# Patient Record
Sex: MALE | URBAN - METROPOLITAN AREA
[De-identification: names, ages, dates, MRNs, and addresses within clinical notes are randomized per-mention and may not be internally consistent; named-entity substitution may affect disease eponyms.]

---

## 2020-07-06 ENCOUNTER — INPATIENT (INPATIENT)
Facility: HOSPITAL | Age: 75
LOS: 3 days | Discharge: HOME | End: 2020-07-10
Attending: SURGERY | Admitting: SURGERY
Payer: MEDICARE

## 2020-07-06 VITALS
OXYGEN SATURATION: 100 % | RESPIRATION RATE: 16 BRPM | TEMPERATURE: 98 F | SYSTOLIC BLOOD PRESSURE: 117 MMHG | DIASTOLIC BLOOD PRESSURE: 56 MMHG | HEART RATE: 53 BPM

## 2020-07-06 DIAGNOSIS — Z98.890 OTHER SPECIFIED POSTPROCEDURAL STATES: Chronic | ICD-10-CM

## 2020-07-06 DIAGNOSIS — Z90.49 ACQUIRED ABSENCE OF OTHER SPECIFIED PARTS OF DIGESTIVE TRACT: Chronic | ICD-10-CM

## 2020-07-06 DIAGNOSIS — I95.9 HYPOTENSION, UNSPECIFIED: ICD-10-CM

## 2020-07-06 LAB
ALBUMIN SERPL ELPH-MCNC: 3.3 G/DL — SIGNIFICANT CHANGE UP (ref 3.3–5)
ALBUMIN SERPL ELPH-MCNC: 3.4 G/DL — SIGNIFICANT CHANGE UP (ref 3.3–5)
ALP SERPL-CCNC: 57 U/L — SIGNIFICANT CHANGE UP (ref 40–120)
ALP SERPL-CCNC: 64 U/L — SIGNIFICANT CHANGE UP (ref 40–120)
ALT FLD-CCNC: 26 U/L — SIGNIFICANT CHANGE UP (ref 4–41)
ALT FLD-CCNC: 30 U/L — SIGNIFICANT CHANGE UP (ref 4–41)
ANION GAP SERPL CALC-SCNC: 14 MMO/L — SIGNIFICANT CHANGE UP (ref 7–14)
ANION GAP SERPL CALC-SCNC: 9 MMO/L — SIGNIFICANT CHANGE UP (ref 7–14)
APTT BLD: 33.2 SEC — SIGNIFICANT CHANGE UP (ref 27–36.3)
AST SERPL-CCNC: 17 U/L — SIGNIFICANT CHANGE UP (ref 4–40)
AST SERPL-CCNC: 21 U/L — SIGNIFICANT CHANGE UP (ref 4–40)
BILIRUB SERPL-MCNC: 0.6 MG/DL — SIGNIFICANT CHANGE UP (ref 0.2–1.2)
BILIRUB SERPL-MCNC: 0.8 MG/DL — SIGNIFICANT CHANGE UP (ref 0.2–1.2)
BLD GP AB SCN SERPL QL: NEGATIVE — SIGNIFICANT CHANGE UP
BUN SERPL-MCNC: 32 MG/DL — HIGH (ref 7–23)
BUN SERPL-MCNC: 33 MG/DL — HIGH (ref 7–23)
CALCIUM SERPL-MCNC: 8.5 MG/DL — SIGNIFICANT CHANGE UP (ref 8.4–10.5)
CALCIUM SERPL-MCNC: 8.8 MG/DL — SIGNIFICANT CHANGE UP (ref 8.4–10.5)
CHLORIDE SERPL-SCNC: 101 MMOL/L — SIGNIFICANT CHANGE UP (ref 98–107)
CHLORIDE SERPL-SCNC: 106 MMOL/L — SIGNIFICANT CHANGE UP (ref 98–107)
CO2 SERPL-SCNC: 23 MMOL/L — SIGNIFICANT CHANGE UP (ref 22–31)
CO2 SERPL-SCNC: 25 MMOL/L — SIGNIFICANT CHANGE UP (ref 22–31)
CREAT SERPL-MCNC: 1.52 MG/DL — HIGH (ref 0.5–1.3)
CREAT SERPL-MCNC: 1.89 MG/DL — HIGH (ref 0.5–1.3)
GLUCOSE SERPL-MCNC: 138 MG/DL — HIGH (ref 70–99)
GLUCOSE SERPL-MCNC: 221 MG/DL — HIGH (ref 70–99)
HCT VFR BLD CALC: 24.4 % — LOW (ref 39–50)
HCT VFR BLD CALC: 25.9 % — LOW (ref 39–50)
HGB BLD-MCNC: 7.7 G/DL — LOW (ref 13–17)
HGB BLD-MCNC: 8 G/DL — LOW (ref 13–17)
INR BLD: 1.8 — HIGH (ref 0.88–1.17)
MAGNESIUM SERPL-MCNC: 2.1 MG/DL — SIGNIFICANT CHANGE UP (ref 1.6–2.6)
MCHC RBC-ENTMCNC: 25.5 PG — LOW (ref 27–34)
MCHC RBC-ENTMCNC: 26.2 PG — LOW (ref 27–34)
MCHC RBC-ENTMCNC: 30.9 % — LOW (ref 32–36)
MCHC RBC-ENTMCNC: 31.6 % — LOW (ref 32–36)
MCV RBC AUTO: 82.5 FL — SIGNIFICANT CHANGE UP (ref 80–100)
MCV RBC AUTO: 83 FL — SIGNIFICANT CHANGE UP (ref 80–100)
NRBC # FLD: 0 K/UL — SIGNIFICANT CHANGE UP (ref 0–0)
NRBC # FLD: 0 K/UL — SIGNIFICANT CHANGE UP (ref 0–0)
OB PNL STL: NEGATIVE — SIGNIFICANT CHANGE UP
PHOSPHATE SERPL-MCNC: 3.5 MG/DL — SIGNIFICANT CHANGE UP (ref 2.5–4.5)
PLATELET # BLD AUTO: 161 K/UL — SIGNIFICANT CHANGE UP (ref 150–400)
PLATELET # BLD AUTO: 174 K/UL — SIGNIFICANT CHANGE UP (ref 150–400)
PMV BLD: 9.1 FL — SIGNIFICANT CHANGE UP (ref 7–13)
PMV BLD: 9.3 FL — SIGNIFICANT CHANGE UP (ref 7–13)
POTASSIUM SERPL-MCNC: 3.8 MMOL/L — SIGNIFICANT CHANGE UP (ref 3.5–5.3)
POTASSIUM SERPL-MCNC: 4 MMOL/L — SIGNIFICANT CHANGE UP (ref 3.5–5.3)
POTASSIUM SERPL-SCNC: 3.8 MMOL/L — SIGNIFICANT CHANGE UP (ref 3.5–5.3)
POTASSIUM SERPL-SCNC: 4 MMOL/L — SIGNIFICANT CHANGE UP (ref 3.5–5.3)
PROT SERPL-MCNC: 5.7 G/DL — LOW (ref 6–8.3)
PROT SERPL-MCNC: 6.1 G/DL — SIGNIFICANT CHANGE UP (ref 6–8.3)
PROTHROM AB SERPL-ACNC: 20.9 SEC — HIGH (ref 9.8–13.1)
RBC # BLD: 2.94 M/UL — LOW (ref 4.2–5.8)
RBC # BLD: 3.14 M/UL — LOW (ref 4.2–5.8)
RBC # FLD: 15.8 % — HIGH (ref 10.3–14.5)
RBC # FLD: 15.9 % — HIGH (ref 10.3–14.5)
RH IG SCN BLD-IMP: POSITIVE — SIGNIFICANT CHANGE UP
RH IG SCN BLD-IMP: POSITIVE — SIGNIFICANT CHANGE UP
SARS-COV-2 RNA SPEC QL NAA+PROBE: SIGNIFICANT CHANGE UP
SODIUM SERPL-SCNC: 138 MMOL/L — SIGNIFICANT CHANGE UP (ref 135–145)
SODIUM SERPL-SCNC: 140 MMOL/L — SIGNIFICANT CHANGE UP (ref 135–145)
TROPONIN T, HIGH SENSITIVITY: 23 NG/L — SIGNIFICANT CHANGE UP (ref ?–14)
WBC # BLD: 10.28 K/UL — SIGNIFICANT CHANGE UP (ref 3.8–10.5)
WBC # BLD: 8.27 K/UL — SIGNIFICANT CHANGE UP (ref 3.8–10.5)
WBC # FLD AUTO: 10.28 K/UL — SIGNIFICANT CHANGE UP (ref 3.8–10.5)
WBC # FLD AUTO: 8.27 K/UL — SIGNIFICANT CHANGE UP (ref 3.8–10.5)

## 2020-07-06 PROCEDURE — 74174 CTA ABD&PLVS W/CONTRAST: CPT | Mod: 26

## 2020-07-06 PROCEDURE — 71275 CT ANGIOGRAPHY CHEST: CPT | Mod: 26

## 2020-07-06 PROCEDURE — 99285 EMERGENCY DEPT VISIT HI MDM: CPT

## 2020-07-06 PROCEDURE — 71045 X-RAY EXAM CHEST 1 VIEW: CPT | Mod: 26

## 2020-07-06 PROCEDURE — 99221 1ST HOSP IP/OBS SF/LOW 40: CPT | Mod: AI,GC

## 2020-07-06 PROCEDURE — 99222 1ST HOSP IP/OBS MODERATE 55: CPT

## 2020-07-06 RX ORDER — AMIODARONE HYDROCHLORIDE 400 MG/1
0 TABLET ORAL
Qty: 0 | Refills: 0 | DISCHARGE

## 2020-07-06 RX ORDER — SODIUM CHLORIDE 9 MG/ML
1000 INJECTION INTRAMUSCULAR; INTRAVENOUS; SUBCUTANEOUS ONCE
Refills: 0 | Status: COMPLETED | OUTPATIENT
Start: 2020-07-06 | End: 2020-07-06

## 2020-07-06 RX ORDER — SODIUM CHLORIDE 9 MG/ML
1000 INJECTION, SOLUTION INTRAVENOUS
Refills: 0 | Status: DISCONTINUED | OUTPATIENT
Start: 2020-07-06 | End: 2020-07-08

## 2020-07-06 RX ORDER — FINASTERIDE 5 MG/1
5 TABLET, FILM COATED ORAL DAILY
Refills: 0 | Status: DISCONTINUED | OUTPATIENT
Start: 2020-07-06 | End: 2020-07-10

## 2020-07-06 RX ORDER — LOSARTAN POTASSIUM 100 MG/1
1 TABLET, FILM COATED ORAL
Qty: 0 | Refills: 0 | DISCHARGE

## 2020-07-06 RX ORDER — TAMSULOSIN HYDROCHLORIDE 0.4 MG/1
0.4 CAPSULE ORAL AT BEDTIME
Refills: 0 | Status: DISCONTINUED | OUTPATIENT
Start: 2020-07-06 | End: 2020-07-10

## 2020-07-06 RX ORDER — ATORVASTATIN CALCIUM 80 MG/1
40 TABLET, FILM COATED ORAL AT BEDTIME
Refills: 0 | Status: DISCONTINUED | OUTPATIENT
Start: 2020-07-06 | End: 2020-07-10

## 2020-07-06 RX ORDER — ACETAMINOPHEN 500 MG
1000 TABLET ORAL EVERY 6 HOURS
Refills: 0 | Status: DISCONTINUED | OUTPATIENT
Start: 2020-07-06 | End: 2020-07-06

## 2020-07-06 RX ORDER — OMEPRAZOLE 10 MG/1
1 CAPSULE, DELAYED RELEASE ORAL
Qty: 0 | Refills: 0 | DISCHARGE

## 2020-07-06 RX ORDER — HYDROMORPHONE HYDROCHLORIDE 2 MG/ML
0.5 INJECTION INTRAMUSCULAR; INTRAVENOUS; SUBCUTANEOUS EVERY 4 HOURS
Refills: 0 | Status: DISCONTINUED | OUTPATIENT
Start: 2020-07-06 | End: 2020-07-09

## 2020-07-06 RX ORDER — TAMSULOSIN HYDROCHLORIDE 0.4 MG/1
1 CAPSULE ORAL
Qty: 0 | Refills: 0 | DISCHARGE

## 2020-07-06 RX ORDER — FUROSEMIDE 40 MG
60 TABLET ORAL
Qty: 0 | Refills: 0 | DISCHARGE

## 2020-07-06 RX ORDER — AMIODARONE HYDROCHLORIDE 400 MG/1
200 TABLET ORAL
Refills: 0 | Status: DISCONTINUED | OUTPATIENT
Start: 2020-07-06 | End: 2020-07-09

## 2020-07-06 RX ORDER — MULTIVIT-MIN/FERROUS GLUCONATE 9 MG/15 ML
1 LIQUID (ML) ORAL
Qty: 0 | Refills: 0 | DISCHARGE

## 2020-07-06 RX ORDER — FENTANYL CITRATE 50 UG/ML
50 INJECTION INTRAVENOUS ONCE
Refills: 0 | Status: DISCONTINUED | OUTPATIENT
Start: 2020-07-06 | End: 2020-07-06

## 2020-07-06 RX ORDER — ATORVASTATIN CALCIUM 80 MG/1
1 TABLET, FILM COATED ORAL
Qty: 0 | Refills: 0 | DISCHARGE

## 2020-07-06 RX ORDER — PANTOPRAZOLE SODIUM 20 MG/1
40 TABLET, DELAYED RELEASE ORAL
Refills: 0 | Status: DISCONTINUED | OUTPATIENT
Start: 2020-07-06 | End: 2020-07-10

## 2020-07-06 RX ORDER — HYDROMORPHONE HYDROCHLORIDE 2 MG/ML
1 INJECTION INTRAMUSCULAR; INTRAVENOUS; SUBCUTANEOUS EVERY 4 HOURS
Refills: 0 | Status: DISCONTINUED | OUTPATIENT
Start: 2020-07-06 | End: 2020-07-09

## 2020-07-06 RX ORDER — METOPROLOL TARTRATE 50 MG
25 TABLET ORAL
Refills: 0 | Status: DISCONTINUED | OUTPATIENT
Start: 2020-07-06 | End: 2020-07-06

## 2020-07-06 RX ORDER — FINASTERIDE 5 MG/1
1 TABLET, FILM COATED ORAL
Qty: 0 | Refills: 0 | DISCHARGE

## 2020-07-06 RX ORDER — DOXAZOSIN MESYLATE 4 MG
4 TABLET ORAL AT BEDTIME
Refills: 0 | Status: DISCONTINUED | OUTPATIENT
Start: 2020-07-06 | End: 2020-07-06

## 2020-07-06 RX ORDER — METOPROLOL TARTRATE 50 MG
25 TABLET ORAL
Refills: 0 | Status: DISCONTINUED | OUTPATIENT
Start: 2020-07-06 | End: 2020-07-09

## 2020-07-06 RX ORDER — FUROSEMIDE 40 MG
60 TABLET ORAL DAILY
Refills: 0 | Status: DISCONTINUED | OUTPATIENT
Start: 2020-07-06 | End: 2020-07-07

## 2020-07-06 RX ORDER — ACETAMINOPHEN 500 MG
1000 TABLET ORAL EVERY 6 HOURS
Refills: 0 | Status: COMPLETED | OUTPATIENT
Start: 2020-07-06 | End: 2020-07-07

## 2020-07-06 RX ORDER — TERAZOSIN HYDROCHLORIDE 10 MG/1
1 CAPSULE ORAL
Qty: 0 | Refills: 0 | DISCHARGE

## 2020-07-06 RX ADMIN — HYDROMORPHONE HYDROCHLORIDE 1 MILLIGRAM(S): 2 INJECTION INTRAMUSCULAR; INTRAVENOUS; SUBCUTANEOUS at 16:48

## 2020-07-06 RX ADMIN — FENTANYL CITRATE 50 MICROGRAM(S): 50 INJECTION INTRAVENOUS at 10:26

## 2020-07-06 RX ADMIN — Medication 400 MILLIGRAM(S): at 19:53

## 2020-07-06 RX ADMIN — SODIUM CHLORIDE 1000 MILLILITER(S): 9 INJECTION INTRAMUSCULAR; INTRAVENOUS; SUBCUTANEOUS at 08:43

## 2020-07-06 RX ADMIN — SODIUM CHLORIDE 1000 MILLILITER(S): 9 INJECTION INTRAMUSCULAR; INTRAVENOUS; SUBCUTANEOUS at 06:48

## 2020-07-06 RX ADMIN — SODIUM CHLORIDE 1000 MILLILITER(S): 9 INJECTION INTRAMUSCULAR; INTRAVENOUS; SUBCUTANEOUS at 10:45

## 2020-07-06 RX ADMIN — SODIUM CHLORIDE 1000 MILLILITER(S): 9 INJECTION INTRAMUSCULAR; INTRAVENOUS; SUBCUTANEOUS at 08:39

## 2020-07-06 NOTE — H&P ADULT - HISTORY OF PRESENT ILLNESS
75M w/ PMH HTN, HLD, afib (on Eliquis) BPH and liver cysts, and past surgical history of colon resection for colon carcinoma (unclear from patient history what was resected) and an umbilical hernia repair with mesh presents with weakness, hypotension, bradycardia. Pt states has felt weak, while on the toilet this morning. Per EMS daughter reported pt's SBP was 70 at home and he was diaphoretic.  Upon arriving to the ED patient hbg was 8.0 and he was hypotensive. Patient received CT scan showing a ruptured multiloculated liver cyst with hemoperitoneum. Patient states that he has a long history of liver cysts and periodically has them assessed. He denies any abdominal pain nausea, vomiting, chest pain or SOB. States he took his BP medications this am. Patient is a social drinking and claims to have had a pack of cigarettes daily for the last 8 years. 75M w/ PMH HTN, HLD, afib (on Eliquis) BPH and liver cysts, and past surgical history of cardiac stent x1 3 months ago, colon resection for colon carcinoma (unclear from patient history what was resected) and an umbilical hernia repair with mesh presents with weakness, hypotension, bradycardia. Pt states has felt weak, while on the toilet this morning. Per EMS daughter reported pt's SBP was 70 at home and he was diaphoretic.  Upon arriving to the ED patient hbg was 8.0 and he was hypotensive. Patient received CT scan showing a ruptured multiloculated liver cyst with hemoperitoneum. Patient states that he has a long history of liver cysts and periodically has them assessed. . Patient is complaining of abdominal and back pain. He denies any pain nausea, vomiting, chest pain or SOB. States he took his BP medications this am. Patient is a social drinking and claims to have had a pack of cigarettes daily for the last 8 years. 75M w/ PMH HTN, CHF, HLD, afib (on Eliquis) BPH and liver cysts, and past surgical history of cardiac stent x1 3 months ago, colon resection for colon carcinoma (unclear from patient history what was resected) and an umbilical hernia repair with mesh presents with weakness, hypotension, bradycardia. Pt states has felt weak, while on the toilet this morning. Per EMS daughter reported pt's SBP was 70 at home and he was diaphoretic.  Upon arriving to the ED patient hbg was 8.0 and he was hypotensive. Patient received CT scan showing a ruptured multiloculated liver cyst with hemoperitoneum. Patient states that he has a long history of liver cysts and periodically has them assessed. . Patient is complaining of abdominal and back pain. He denies any pain nausea, vomiting, chest pain or SOB. States he took his BP medications this am. Patient is a social drinking and claims to have had a pack of cigarettes daily for the last 8 years.

## 2020-07-06 NOTE — ED PROVIDER NOTE - ATTENDING CONTRIBUTION TO CARE
I performed a face to face evaluation of this patient and performed a full history and physical examination on the patient.  I agree with the resident's history, physical examination, and plan of the patient.  75M on AC p/w weakness, bradycardia, hypotension w/ some generalized CP, abd pain on exam, appearing pale, clammy. Concern for poss GI bleed vs beta blocker overdose vs poss acs/intra-abdominal etiology - will send occult, labs, ekg, monitor VS, f/u w/ imaging as determined. Likely admission.  Pale, belly soft minimal ttp, heart and lungs wnl, consider bleeding, rectal or other, consider cardiac as well.

## 2020-07-06 NOTE — CONSULT NOTE ADULT - SUBJECTIVE AND OBJECTIVE BOX
SICU Consultation Note  =====================================================  HPI: 75y Male  HPI:  75M w/ PMH HTN, HLD, afib (on Eliquis) BPH s/p TURP and liver cysts, and past surgical history of cardiac stent x1 placement around 1 year ago, colon resection for colon carcinoma (unclear from patient history what was resected) and an umbilical hernia repair with mesh presents with weakness, hypotension, bradycardia. Pt states he was getting up to urinate at midnight when he felt abdominal pain and became dizzy. He called his daughter who works in ICU and together they called EMS. Patient denies loss of consciousness or fall. Per EMS daughter reported pt's SBP was 70 at home and he was diaphoretic.  Upon arriving to the ED patient hbg was 8.0 and he was hypotensive. Patient received 1 L fluid bolus and 1 u pRBC. Patient responded well to the fluid. Patient received CT scan showing a ruptured multiloculated liver cyst with hemoperitoneum. Patient states that he has a long history of liver cysts and periodically has them assessed. Patient is complaining of abdominal and back pain. He denies any pain nausea, vomiting, chest pain or SOB. States he took his BP medications this am. Patient reports he stopped smoking 30 years ago, however according to surgery note patient has been smoking 1 pack a year for the last 8 years.     PAST MEDICAL & SURGICAL HISTORY:  Benign prostate hyperplasia  Hyperlipidemia  Hypertension  H/O ventral hernia repair  S/P colon resection    Home Meds: Home Medications:  amiodarone 200 mg oral tablet: orally 2 times a day (06 Jul 2020 13:59)  atorvastatin 40 mg oral tablet: 1 tab(s) orally once a day (06 Jul 2020 13:59)  Brilinta (ticagrelor) 90 mg oral tablet: 1 tab(s) orally 2 times a day (06 Jul 2020 13:59)  Centrum oral tablet: 1 tab(s) orally once a day (06 Jul 2020 13:59)  Eliquis 5 mg oral tablet: 1 tab(s) orally 2 times a day (06 Jul 2020 13:59)  finasteride 5 mg oral tablet: 1 tab(s) orally once a day (06 Jul 2020 13:59)  furosemide: 60 milligram(s) orally once a day (06 Jul 2020 13:59)  losartan 100 mg oral tablet: 1 tab(s) orally once a day (06 Jul 2020 13:59)  metoprolol succinate 50 mg oral tablet, extended release: 1 tab(s) orally once a day (06 Jul 2020 13:59)  omeprazole 40 mg oral delayed release capsule: 1 cap(s) orally once a day (06 Jul 2020 13:59)  tamsulosin 0.4 mg oral capsule: 1 cap(s) orally once a day (06 Jul 2020 13:59)  terazosin 5 mg oral tablet: 1 tab(s) orally once a day (06 Jul 2020 13:59)    Allergies: Allergies    penicillin (Swelling)    Intolerances      Soc:   Advanced Directives: Presumed Full Code     ROS:    REVIEW OF SYSTEMS    [ ] A ten-point review of systems was otherwise negative except as noted.  [ ] Due to altered mental status/intubation, subjective information were not able to be obtained from the patient. History was obtained, to the extent possible, from review of the chart and collateral sources of information.      CURRENT MEDICATIONS:   --------------------------------------------------------------------------------------  Neurologic Medications    Respiratory Medications    Cardiovascular Medications    Gastrointestinal Medications    Genitourinary Medications    Hematologic/Oncologic Medications    Antimicrobial/Immunologic Medications    Endocrine/Metabolic Medications    Topical/Other Medications    --------------------------------------------------------------------------------------    VITAL SIGNS, INS/OUTS (last 24 hours):  --------------------------------------------------------------------------------------  ICU Vital Signs Last 24 Hrs  T(C): 36.6 (06 Jul 2020 13:00), Max: 36.7 (06 Jul 2020 11:00)  T(F): 97.8 (06 Jul 2020 13:00), Max: 98.1 (06 Jul 2020 11:00)  HR: 60 (06 Jul 2020 13:00) (51 - 60)  BP: 143/64 (06 Jul 2020 13:00) (86/40 - 143/64)  BP(mean): --  ABP: --  ABP(mean): --  RR: 18 (06 Jul 2020 13:00) (16 - 19)  SpO2: 99% (06 Jul 2020 13:00) (98% - 100%)    I&O's Summary    --------------------------------------------------------------------------------------    EXAM:  General/Neuro  RASS:   GCS:   Exam: Normal, NAD, alert, oriented x 3, no focal deficits. PERRLA  ***    Respiratory  Exam: Lungs clear to auscultation, Normal expansion/effort.  ***  [] Tracheostomy   [] Intubated  Mechanical Ventilation:     Cardiovascular  Exam: S1, S2.  Regular rate and rhythm.  Peripheral edema  ***  Cardiac Rhythm: Normal Sinus Rhythm  ECHO:     GI  Exam: Abdomen soft, Non-tender, Non-distended.  Gastrostomy / Jejunostomy tube in place.  Nasogastric tube in place.  Colostomy / Ileostomy.  ***  Wound:   ***  Current Diet:  NPO***      Tubes/Lines/Drains  ***  [x] Peripheral IV  [] Central Venous Line     	[] R	[] L	[] IJ	[] Fem	[] SC        Type:	    Date Placed:   [] Arterial Line		[] R	[] L	[] Fem	[] Rad	[] Ax	Date Placed:   [] PICC:         	[] Midline		[] Mediport           [] Urinary Catheter		Date Placed:     Extremities  Exam: Extremities warm, pink, well-perfused.        Derm:  Exam: Good skin turgor, no skin breakdown.      :   Exam: Cooper catheter in place.     LABS  --------------------------------------------------------------------------------------  Labs:  CAPILLARY BLOOD GLUCOSE                              8.0    8.27  )-----------( 174      ( 06 Jul 2020 06:44 )             25.9         07-06    138  |  101  |  32<H>  ----------------------------<  221<H>  3.8   |  23  |  1.89<H>      eGFR if Non : 34 mL/min (07-06-20 @ 06:44)      LFTs:             6.1  | 0.6  | 21       ------------------[64      ( 06 Jul 2020 06:44 )  3.4  | x    | 30          Lipase:x      Amylase:x             Coags:     20.9   ----< 1.80    ( 06 Jul 2020 06:44 )     33.2                      --------------------------------------------------------------------------------------    OTHER LABS    IMAGING RESULTS      ASSESSMENT:  75y Male ***    PLAN:   Neurologic:   Respiratory:   Cardiovascular:   Gastrointestinal/Nutrition:   Renal/Genitourinary:   Hematologic:   Infectious Disease:   Lines/Tubes:  Endocrine:   Disposition:     --------------------------------------------------------------------------------------    Critical Care Diagnoses: SICU Consultation Note  =====================================================  HPI: 75y Male  HPI:  75M w/ PMH HTN, HLD, afib (on Eliquis) BPH s/p TURP and liver cysts, and past surgical history of cardiac stent x1 placement around 1 year ago, colon resection for colon carcinoma (unclear from patient history what was resected) and an umbilical hernia repair with mesh presents with weakness, hypotension, bradycardia. Pt states he was getting up to urinate at midnight when he felt abdominal pain and became dizzy. He called his daughter who works in ICU and together they called EMS. Patient denies loss of consciousness or fall. Per EMS daughter reported pt's SBP was 70 at home and he was diaphoretic.  Upon arriving to the ED patient hbg was 8.0 and he was hypotensive. Patient received 1 L fluid bolus and 1 u pRBC. Patient responded well to the fluid. Patient received CT scan showing a ruptured multiloculated liver cyst with hemoperitoneum. Patient states that he has a long history of liver cysts and periodically has them assessed. Patient is complaining of abdominal and back pain. He denies any pain nausea, vomiting, chest pain or SOB. States he took his BP medications this am. Patient reports he stopped smoking 30 years ago, however according to surgery note patient has been smoking 1 pack a year for the last 8 years.     PAST MEDICAL & SURGICAL HISTORY:  Benign prostate hyperplasia  Hyperlipidemia  Hypertension  H/O ventral hernia repair  S/P colon resection    Home Meds: Home Medications:  amiodarone 200 mg oral tablet: orally 2 times a day (06 Jul 2020 13:59)  atorvastatin 40 mg oral tablet: 1 tab(s) orally once a day (06 Jul 2020 13:59)  Brilinta (ticagrelor) 90 mg oral tablet: 1 tab(s) orally 2 times a day (06 Jul 2020 13:59)  Centrum oral tablet: 1 tab(s) orally once a day (06 Jul 2020 13:59)  Eliquis 5 mg oral tablet: 1 tab(s) orally 2 times a day (06 Jul 2020 13:59)  finasteride 5 mg oral tablet: 1 tab(s) orally once a day (06 Jul 2020 13:59)  furosemide: 60 milligram(s) orally once a day (06 Jul 2020 13:59)  losartan 100 mg oral tablet: 1 tab(s) orally once a day (06 Jul 2020 13:59)  metoprolol succinate 50 mg oral tablet, extended release: 1 tab(s) orally once a day (06 Jul 2020 13:59)  omeprazole 40 mg oral delayed release capsule: 1 cap(s) orally once a day (06 Jul 2020 13:59)  tamsulosin 0.4 mg oral capsule: 1 cap(s) orally once a day (06 Jul 2020 13:59)  terazosin 5 mg oral tablet: 1 tab(s) orally once a day (06 Jul 2020 13:59)    Allergies: Allergies    penicillin (Swelling)    Intolerances      Soc:   Advanced Directives: Presumed Full Code     ROS:    REVIEW OF SYSTEMS    [ ] A ten-point review of systems was otherwise negative except as noted.  [ ] Due to altered mental status/intubation, subjective information were not able to be obtained from the patient. History was obtained, to the extent possible, from review of the chart and collateral sources of information.      CURRENT MEDICATIONS:   --------------------------------------------------------------------------------------  Neurologic Medications    Respiratory Medications    Cardiovascular Medications    Gastrointestinal Medications    Genitourinary Medications    Hematologic/Oncologic Medications    Antimicrobial/Immunologic Medications    Endocrine/Metabolic Medications    Topical/Other Medications    --------------------------------------------------------------------------------------    VITAL SIGNS, INS/OUTS (last 24 hours):  --------------------------------------------------------------------------------------  ICU Vital Signs Last 24 Hrs  T(C): 36.6 (06 Jul 2020 13:00), Max: 36.7 (06 Jul 2020 11:00)  T(F): 97.8 (06 Jul 2020 13:00), Max: 98.1 (06 Jul 2020 11:00)  HR: 60 (06 Jul 2020 13:00) (51 - 60)  BP: 143/64 (06 Jul 2020 13:00) (86/40 - 143/64)  BP(mean): --  ABP: --  ABP(mean): --  RR: 18 (06 Jul 2020 13:00) (16 - 19)  SpO2: 99% (06 Jul 2020 13:00) (98% - 100%)    I&O's Summary    --------------------------------------------------------------------------------------    EXAM:  General/Neuro  RASS:   GCS:   Exam: Normal, NAD, alert, oriented x 3, no focal deficits. PERRLA  ***    Respiratory  Exam: Lungs clear to auscultation, Normal expansion/effort.  ***  [] Tracheostomy   [] Intubated  Mechanical Ventilation:     Cardiovascular  Exam: S1, S2.  Regular rate and rhythm.  Peripheral edema  ***  Cardiac Rhythm: Normal Sinus Rhythm  ECHO:     GI  Exam: Abdomen soft, Non-tender, Non-distended.  Gastrostomy / Jejunostomy tube in place.  Nasogastric tube in place.  Colostomy / Ileostomy.  ***  Wound:   ***  Current Diet:  NPO***      Tubes/Lines/Drains  ***  [x] Peripheral IV  [] Central Venous Line     	[] R	[] L	[] IJ	[] Fem	[] SC        Type:	    Date Placed:   [] Arterial Line		[] R	[] L	[] Fem	[] Rad	[] Ax	Date Placed:   [] PICC:         	[] Midline		[] Mediport           [] Urinary Catheter		Date Placed:     Extremities  Exam: Extremities warm, pink, well-perfused.        Derm:  Exam: Good skin turgor, no skin breakdown.      :   Exam: Cooper catheter in place.     LABS  --------------------------------------------------------------------------------------  Labs:  CAPILLARY BLOOD GLUCOSE                              8.0    8.27  )-----------( 174      ( 06 Jul 2020 06:44 )             25.9         07-06    138  |  101  |  32<H>  ----------------------------<  221<H>  3.8   |  23  |  1.89<H>      eGFR if Non : 34 mL/min (07-06-20 @ 06:44)      LFTs:             6.1  | 0.6  | 21       ------------------[64      ( 06 Jul 2020 06:44 )  3.4  | x    | 30          Lipase:x      Amylase:x             Coags:     20.9   ----< 1.80    ( 06 Jul 2020 06:44 )     33.2                      --------------------------------------------------------------------------------------    OTHER LABS    IMAGING RESULTS      ASSESSMENT:  75y Male PMH HTN, HLD, afib (on Eliquis) BPH s/p TURP and liver cysts,    PLAN:   Neurologic:    Respiratory:   Cardiovascular:   Gastrointestinal/Nutrition:   Renal/Genitourinary:   Hematologic:   Infectious Disease:   Lines/Tubes:  Endocrine:   Disposition:     --------------------------------------------------------------------------------------    Critical Care Diagnoses: SICU Consultation Note  =====================================================  HPI: 75y Male  HPI:  75M w/ PMH HTN, HLD, afib (on Eliquis) BPH s/p TURP and liver cysts, and past surgical history of cardiac stent x1 placement around 1 year ago, colon resection for colon carcinoma (unclear from patient history what was resected) and an umbilical hernia repair with mesh presents with weakness, hypotension, bradycardia. Pt states he was getting up to urinate at midnight when he felt abdominal pain and became dizzy. He called his daughter who works in ICU and together they called EMS. Patient denies loss of consciousness or fall. Per EMS daughter reported pt's SBP was 70 at home and he was diaphoretic.  Upon arriving to the ED patient hbg was 8.0 and he was hypotensive. Patient received 1 L fluid bolus and 1 u pRBC. Patient responded well to the fluid. Patient received CT scan showing a ruptured multiloculated liver cyst with hemoperitoneum. Patient states that he has a long history of liver cysts and periodically has them assessed. Patient is complaining of abdominal and back pain. He denies any pain nausea, vomiting, chest pain or SOB. States he took his BP medications this am. Patient reports he stopped smoking 30 years ago, however according to surgery note patient has been smoking 1 pack a year for the last 8 years.     PAST MEDICAL & SURGICAL HISTORY:  Benign prostate hyperplasia  Hyperlipidemia  Hypertension  H/O ventral hernia repair  S/P colon resection    Home Meds: Home Medications:  amiodarone 200 mg oral tablet: orally 2 times a day (06 Jul 2020 13:59)  atorvastatin 40 mg oral tablet: 1 tab(s) orally once a day (06 Jul 2020 13:59)  Brilinta (ticagrelor) 90 mg oral tablet: 1 tab(s) orally 2 times a day (06 Jul 2020 13:59)  Centrum oral tablet: 1 tab(s) orally once a day (06 Jul 2020 13:59)  Eliquis 5 mg oral tablet: 1 tab(s) orally 2 times a day (06 Jul 2020 13:59)  finasteride 5 mg oral tablet: 1 tab(s) orally once a day (06 Jul 2020 13:59)  furosemide: 60 milligram(s) orally once a day (06 Jul 2020 13:59)  losartan 100 mg oral tablet: 1 tab(s) orally once a day (06 Jul 2020 13:59)  metoprolol succinate 50 mg oral tablet, extended release: 1 tab(s) orally once a day (06 Jul 2020 13:59)  omeprazole 40 mg oral delayed release capsule: 1 cap(s) orally once a day (06 Jul 2020 13:59)  tamsulosin 0.4 mg oral capsule: 1 cap(s) orally once a day (06 Jul 2020 13:59)  terazosin 5 mg oral tablet: 1 tab(s) orally once a day (06 Jul 2020 13:59)    Allergies: Allergies    penicillin (Swelling)    Intolerances      Soc:   Advanced Directives: Presumed Full Code     ROS:    REVIEW OF SYSTEMS    [ ] A ten-point review of systems was otherwise negative except as noted.  [ ] Due to altered mental status/intubation, subjective information were not able to be obtained from the patient. History was obtained, to the extent possible, from review of the chart and collateral sources of information.      CURRENT MEDICATIONS:   --------------------------------------------------------------------------------------  Neurologic Medications    Respiratory Medications    Cardiovascular Medications    Gastrointestinal Medications    Genitourinary Medications    Hematologic/Oncologic Medications    Antimicrobial/Immunologic Medications    Endocrine/Metabolic Medications    Topical/Other Medications    --------------------------------------------------------------------------------------    VITAL SIGNS, INS/OUTS (last 24 hours):  --------------------------------------------------------------------------------------  ICU Vital Signs Last 24 Hrs  T(C): 36.6 (06 Jul 2020 13:00), Max: 36.7 (06 Jul 2020 11:00)  T(F): 97.8 (06 Jul 2020 13:00), Max: 98.1 (06 Jul 2020 11:00)  HR: 60 (06 Jul 2020 13:00) (51 - 60)  BP: 143/64 (06 Jul 2020 13:00) (86/40 - 143/64)  BP(mean): --  ABP: --  ABP(mean): --  RR: 18 (06 Jul 2020 13:00) (16 - 19)  SpO2: 99% (06 Jul 2020 13:00) (98% - 100%)    I&O's Summary    --------------------------------------------------------------------------------------    EXAM:  General/Neuro  RASS:   GCS:   Exam: Normal, NAD, alert, oriented x 3, no focal deficits. PERRLA  ***    Respiratory  Exam: Lungs clear to auscultation, Normal expansion/effort.  ***  [] Tracheostomy   [] Intubated  Mechanical Ventilation:     Cardiovascular  Exam: S1, S2.  Regular rate and rhythm.  Peripheral edema  ***  Cardiac Rhythm: Normal Sinus Rhythm  ECHO:     GI  Exam: Abdomen soft, Non-tender, Non-distended.  Gastrostomy / Jejunostomy tube in place.  Nasogastric tube in place.  Colostomy / Ileostomy.  ***  Wound:   ***  Current Diet:  NPO***      Tubes/Lines/Drains  ***  [x] Peripheral IV  [] Central Venous Line     	[] R	[] L	[] IJ	[] Fem	[] SC        Type:	    Date Placed:   [] Arterial Line		[] R	[] L	[] Fem	[] Rad	[] Ax	Date Placed:   [] PICC:         	[] Midline		[] Mediport           [] Urinary Catheter		Date Placed:     Extremities  Exam: Extremities warm, pink, well-perfused.        Derm:  Exam: Good skin turgor, no skin breakdown.      :   Exam: Cooper catheter in place.     LABS  --------------------------------------------------------------------------------------  Labs:  CAPILLARY BLOOD GLUCOSE                              8.0    8.27  )-----------( 174      ( 06 Jul 2020 06:44 )             25.9         07-06    138  |  101  |  32<H>  ----------------------------<  221<H>  3.8   |  23  |  1.89<H>      eGFR if Non : 34 mL/min (07-06-20 @ 06:44)      LFTs:             6.1  | 0.6  | 21       ------------------[64      ( 06 Jul 2020 06:44 )  3.4  | x    | 30          Lipase:x      Amylase:x             Coags:     20.9   ----< 1.80    ( 06 Jul 2020 06:44 )     33.2                      --------------------------------------------------------------------------------------    OTHER LABS    IMAGING RESULTS  < from: CT Angio Abdomen and Pelvis w/ IV Cont (07.06.20 @ 10:25) >  IMPRESSION:     No aortic dissection.    Multiple left hepatic lobe cysts with a complex high attenuation/cystic masslike lesion along the inferior edge of the left hepatic lobe that appears to blend in with mildly high intraperitoneal fluid. Differential diagnosis includes hemoperitoneum from a ruptured hepatic cyst versus a complex solid/cystic lesion with an irregular enhancing mural nodule.    Further characterization can be obtained through abdominal ultrasound or MRI with intravenous contrast; MRI would be most definitive for diagnosis.    These findings were discussed with Dr. Ross at 7/6/2020 11:30 AM by Dr. Gerson Castorena of Radiology with read back confirmation.       < end of copied text >

## 2020-07-06 NOTE — ED ADULT NURSE NOTE - NSIMPLEMENTINTERV_GEN_ALL_ED
Implemented All Fall with Harm Risk Interventions:  [place] to call system. Call bell, personal items and telephone within reach. Instruct patient to call for assistance. Room bathroom lighting operational. Non-slip footwear when patient is off stretcher. Physically safe environment: no spills, clutter or unnecessary equipment. Stretcher in lowest position, wheels locked, appropriate side rails in place. Provide visual cue, wrist band, yellow gown, etc. Monitor gait and stability. Monitor for mental status changes and reorient to person, place, and time. Review medications for side effects contributing to fall risk. Reinforce activity limits and safety measures with patient and family. Provide visual clues: red socks.

## 2020-07-06 NOTE — ED ADULT TRIAGE NOTE - CHIEF COMPLAINT QUOTE
Pt arrives from home after daughter saw pt upon him waking noting he was diaphoretic and clammy. Pt daughter is a nurse and reports 70 systolic BP at home. Pt arrives with a LAC 18 and received almost an entire bag of NS. Pt appears in no acute distress in triage, vs as noted and EKG in progress.

## 2020-07-06 NOTE — ED ADULT NURSE NOTE - IS THE PATIENT ABLE TO BE SCREENED?
Lyons.  
Yes

## 2020-07-06 NOTE — ED PROVIDER NOTE - GASTROINTESTINAL NEGATIVE STATEMENT, MLM
no abdominal pain, no bloating, no constipation, no diarrhea, no nausea and no vomiting. no bleeding

## 2020-07-06 NOTE — ED PROVIDER NOTE - PROGRESS NOTE DETAILS
Pt with hemoperitoneum on CT possible from liver mass/cyst rupture, pt HDS and receiving blood, accepted for SICU.

## 2020-07-06 NOTE — ED PROVIDER NOTE - DURATION
Pt is a 89 yr old male from home with pmh of HTN, CAD, DM, Quadruple bypass 10 yrs ago, CAD who came in with chest pain and shortness of breath for 4-5 days. Pt states that he has been worsening breathing difficulty for last few days and today on climbing stair he became very short of breath and was brought to ED. Pt also states that he has been having episodic chest pain , pressure like, no radiation or relation with movement. Pt denies any headache, syncope, abd pain, nausea, vomiting, diarhea.  In ED pt vitals were  ICU Vital Signs Last 24 Hrs  T(C): 36.7 (10 Dec 2019 16:00), Max: 36.7 (10 Dec 2019 16:00)  T(F): 98 (10 Dec 2019 16:00), Max: 98 (10 Dec 2019 16:00)  HR: 88 (10 Dec 2019 16:00) (84 - 88)  BP: 116/62 (10 Dec 2019 10:21) (116/62 - 116/62)  BP(mean): 77 (10 Dec 2019 16:00) (77 - 77)  ABP: --  ABP(mean): --  RR: 20 (10 Dec 2019 16:00) (20 - 20)  SpO2: 98% (10 Dec 2019 16:00) (96% - 98%)    Pt was found to have elevated potassium, lobito on ckd, elevated cardiac enz and bnp. Pt is being admitted for acute coronary syndrome, acute CHF and LOBITO on CKD with hyperkalemia today

## 2020-07-06 NOTE — ED PROVIDER NOTE - CLINICAL SUMMARY MEDICAL DECISION MAKING FREE TEXT BOX
75M on AC p/w weakness, bradycardia, hypotension w/ some generalized CP, abd pain on exam, appearing pale, clammy. Concern for poss GI bleed vs beta blocker overdose vs poss acs/intra-abdominal etiology - will send occult, labs, ekg, monitor VS, f/u w/ imaging as determined. Likely admission.

## 2020-07-06 NOTE — ED PROVIDER NOTE - OBJECTIVE STATEMENT
75M w/ PMH HTN, HLD, afib (on Eliquis) p/w weakness, hypotension, bradycardia. Pt states has felt weak, dizzy since earlier this morning several hours ago. Per EMS daughter reported pt's SBP was 70 at home, he felt diaphoretic, clammy to touch. He denies any f/c/n/v/d but endorses some mild CP, SOB. States he took his BP medications this am. Denies taking more than usual dose of medications. No abd pain, tarry/bloody stools, no hematemesis. Denies fall or trauma to head.

## 2020-07-06 NOTE — CONSULT NOTE ADULT - ATTENDING COMMENTS
Agree with notes of health care providers on my service (PAs, residents and House Staff).  The patient is a patient with hemodynamic and metabolic instability being consulted for SICU care.   I have personally discussed with other consultants, House staff and PA's.   These diagnoses are unrelated to the surgical procedure noted above.  75y Male PMH HTN, HLD, CAD with stent, afib (on Eliquis) BPH s/p TURP and large liver cysts, now with hemoperitoneum from liver cyst rupture.   I have personally examined the patient, reviewed data and laboratory tests/x-rays and all pertinent electronic images.  EXAM:  General/Neuro  Exam: Normal, NAD, alert, oriented x 3, no focal deficits.   Respiratory  Exam: Lungs clear to auscultation, Normal expansion/effort.   Cardiovascular  Exam: S1, S2.  Regular rate and rhythm.    Cardiac Rhythm: Normal Sinus Rhythm  GI  Exam: Abdomen soft, Non-tender, Non-distended.  Gastrostomy / Jejunostomy tube in place.  Nasogastric tube in place.  Colostomy / Ileostomy.  ***    The assessment and plan is specified.  PLAN:   Neurologic: AOx4  - Pain control with IV tylenol, dilaudid for break through    Respiratory:   - Satting well on Room air    Cardiovascular:   - Metoprolol 50mg daily  - Atorvastatin 20mg daily  - losartan 100mg daily holding    Gastrointestinal/Nutrition: NPO  - omeprazole 40 daily  - NPO     Renal/Genitourinary: LR 75  - Lasix 60 daily   - finasteride 5mg daily  - Terazosin 5mg daily    Hematologic:   - Brilinta 90mg BID holding   - Eliquis 5 mg BID holding   - H/H stable trending q6    Infectious Disease:   - No issues     Endocrine:   - No issues     Disposition: SICU    Lines/Tubes: PIV    Discussed with Dr. Guerra

## 2020-07-06 NOTE — H&P ADULT - ASSESSMENT
75M w/ PMH HTN, HLD, afib (on Eliquis) BPH and liver cysts, and past surgical history of colon resection for colon carcinoma (unclear from patient history what was resected) and an umbilical hernia repair with mesh presents with weakness, hypotension, bradycardia. CT scan shows multiloculated liver cyst and hemoperitoneum. Now with stable BP and HR in ER    - SICU consultation for hemorrhage watch  - Repeat CBC to assess response to pRBC  - If clinically worsens, recommend IR consultation for possible embolization   - Please contact D team surgery with questions or concerns    [id number]

## 2020-07-06 NOTE — H&P ADULT - NSHPLABSRESULTS_GEN_ALL_CORE
Complete Blood Count (07.06.20 @ 06:44)    WBC Count: 8.27 K/uL    RBC Count: 3.14 M/uL    Hemoglobin: 8.0 g/dL    Hematocrit: 25.9 %    Mean Cell Volume: 82.5 fL    Mean Cell Hemoglobin: 25.5 pg    Mean Cell Hemoglobin Conc: 30.9 %    Red Cell Distrib Width: 15.9 %    Platelet Count - Automated: 174 K/uL    MPV: 9.3 fl    Nucleated RBC #: 0 K/uL    Comprehensive Metabolic Panel (07.06.20 @ 06:44)    Sodium, Serum: 138 mmol/L    Potassium, Serum: 3.8 mmol/L    Chloride, Serum: 101 mmol/L    Carbon Dioxide, Serum: 23 mmol/L    Anion Gap, Serum: 14 mmo/L    Blood Urea Nitrogen, Serum: 32 mg/dL    Creatinine, Serum: 1.89 mg/dL    Glucose, Serum: 221 mg/dL    Calcium, Total Serum: 8.8 mg/dL    Protein Total, Serum: 6.1 g/dL    Albumin, Serum: 3.4 g/dL    Bilirubin Total, Serum: 0.6 mg/dL    Alkaline Phosphatase, Serum: 64 u/L    Aspartate Aminotransferase (AST/SGOT): 21 u/L    Alanine Aminotransferase (ALT/SGPT): 30 u/L    eGFR if Non : 34: The units for eGFR are ml/min/1.73m2 (normalized body  surface area). The eGFR is calculated from a serum  creatinine using the CKD-EPI equation. Other variables  required for calculation are race, age and sex. Among  patients with chronic kidney disease (CKD), the eGFR is  useful in determining the stage of disease according to  KDOQI CKD classification. All eGFR results are reported  numerically with the following interpretation.    GFR  (ml/min/1.73 m2)          W/KIDNEY DAMAGE    W/O KIDNEY DMG  ==========================================================  >= 90.......................Stage 1..............Normal  60-89.......................Stage 2...........Decreased GFR  30-59.......................Stage 3..............Stage 3  15-29.......................Stage 4..............Stage 4  < 15........................Stage 5..............Stage 5    Each stage of CKD assumes that the associated GFR level  has been in effect for at least 3 months. Determination of  stages one and two (with eGFR > 59ml/min/m2) requires  estimation of kidney damage for at least 3 months as  defined by structural or functional abnormalities.    Limitations: All estimates of GFR will be less accurate  for patients at extremes of muscle mass (including but  not limited to frail elderly, critically ill, or cancer  patients), those with unusual diets, and those with  conditions associated with reduced secretion or  extrarenal elimination of creatinine. The eGFR equation  is not recommended for use in patients with unstable  creatinine levels. mL/min    eGFR if : 39 mL/min

## 2020-07-06 NOTE — ED ADULT NURSE NOTE - OBJECTIVE STATEMENT
Sonia RN: Patient received in room #24 c/o hypotension today. Patient reports he got up from the bathroom and felt dizzy; called his daughter who is a nurse. Patient reports daughter told him he was diaphoretic and hypotensive with systolic in the 70s. Patient is on Eliquis and Brilinta for hx. a fib. Denies any bleeding anywhere. Denies chest pain or shortness of breath. Patient reports b/l shoulder pain worsening when laying down. Patient appears pale. Patient reports he has been eating grapefruit lately and reports he was told it is contraindicated with many of his medications. Sinus yael on MD ADI at bedside for evaluation. VS as noted. Patient received with 18G IV to left ac by EMS, 20G IV Placed in right ac, labs drawn and sent. Report given to primary RN Prince Eaton monitor. Sonia RN: Patient received in room #24 c/o hypotension today. Patient reports he got up from the bathroom and felt dizzy; called his daughter who is a nurse. Patient reports daughter told him he was diaphoretic and hypotensive with systolic in the 70s. Patient is on Eliquis and Brilinta for hx. a fib. Denies any bleeding anywhere. Denies chest pain or shortness of breath. Patient reports b/l shoulder pain worsening when laying down. Patient appears pale. Patient reports he has been eating grapefruit lately and reports he was told it is contraindicated with many of his medications. Sinus yael on MD ADI at bedside for evaluation. VS as noted. Patient received with 18G IV to left ac by EMS, 20G IV Placed in right ac, labs drawn and sent. Blanchable redness observed to sacral area. Report given to primary RN Prince Eaton monitor.

## 2020-07-06 NOTE — H&P ADULT - ATTENDING COMMENTS
- I have seen and examined the patient on rounds. Patient's chart, labs, images and reports reviewed.  Known h/o colon cancer - s/p colectomy many years ago per pt and liver cysts  No h/o trauma  New onset abdominal pain with distension  On Eliquis and Brilinta- Afib and CAD with stent  Abd soft distended minimally tender diffusely  Hb 8  HD stable  CT abd reviewed- moderate hemoperitoneum, multiloculated multiple liver cysts mostly in the left lobe of liver- no active extravasation  Plan:  SICU admit  Monitor serial Hb  Serial Abd exam  If pt drops Hb and / or is HD unstable - consult IR for possible embolization  Cardiac consult- hold AC given acute bleed, INR 1.8- trend serially, if Hb drops need to reverse coagulopathy  - I have discussed the diagnosis and therapeutic plan with the patient in detail. Patient expressed verbal understanding and willingness to proceed with proposed plan. All questions answered  Regards  Terrance Daniels MD, FACS, FICS  - [employer] [facility]  Division of Surgical Oncology, Department of Surgery  [address] [address]    [address] [facility], [facility] [address]    [phone] [address]  Ph: [phone]  Fax: [phone]

## 2020-07-06 NOTE — CHART NOTE - NSCHARTNOTEFT_GEN_A_CORE
LIMITED BEDSIDE ULTRASOUND -- FAST EXAM:    RUQ/Aranda's pouch: ++ moderate   LUQ/Spleno/Renal pouch: ++moderate  Pelvis: Negative    Interpretation: Positive FAST exam

## 2020-07-06 NOTE — ED PROVIDER NOTE - NS ED ROS FT
Gen: Denies fever, weight loss  CV: + CP   Skin: Denies rash, erythema, color changes  Resp: + SOB   Endo: Denies sensitivity to heat, cold, increased urination  GI: Denies constipation, nausea, vomiting  Msk: Denies back pain, LE swelling, extremity pain  : Denies dysuria, increased frequency  Neuro: Denies LOC, + weakness   Psych: Denies hx of psych, hallucinations

## 2020-07-06 NOTE — ED PROVIDER NOTE - PHYSICAL EXAMINATION
Gen: WDWN, NAD, clammy, mildly diaphoretic, HR 55, BP 90s/50s  HEENT: EOMI, no nasal discharge, mucous membranes moist, pale appearing   CV: bradycardic, afib, no M/R/G  Resp: CTAB, no W/R/R  GI: Abdomen soft non-distended, NTTP, no masses, rectal exam w/ no gross blood, non-melanotic stool   MSK: No open wounds, no bruising, no LE edema  Neuro: A&Ox4, following commands, moving all four extremities spontaneously  Psych: appropriate mood, denies AH, VH, SI

## 2020-07-06 NOTE — H&P ADULT - NSHPPHYSICALEXAM_GEN_ALL_CORE
PHYSICAL EXAM:  Constitutional: well developed, NAD  Eyes: anicteric  ENMT: normal facies, symmetric  Respiratory: Breathing comfortably    Gastrointestinal: abdomen distended, dull to percussion, tender to palpation in the RUQ  Extremities: FROM, warm  Neurological: intact, non-focal  Psychiatric: oriented x 3; appropriate

## 2020-07-07 LAB
ALBUMIN SERPL ELPH-MCNC: 3.2 G/DL — LOW (ref 3.3–5)
ALP SERPL-CCNC: 55 U/L — SIGNIFICANT CHANGE UP (ref 40–120)
ALT FLD-CCNC: 27 U/L — SIGNIFICANT CHANGE UP (ref 4–41)
ANION GAP SERPL CALC-SCNC: 13 MMO/L — SIGNIFICANT CHANGE UP (ref 7–14)
APTT BLD: 32.5 SEC — SIGNIFICANT CHANGE UP (ref 27–36.3)
AST SERPL-CCNC: 19 U/L — SIGNIFICANT CHANGE UP (ref 4–40)
BILIRUB SERPL-MCNC: 1 MG/DL — SIGNIFICANT CHANGE UP (ref 0.2–1.2)
BUN SERPL-MCNC: 32 MG/DL — HIGH (ref 7–23)
CALCIUM SERPL-MCNC: 8.6 MG/DL — SIGNIFICANT CHANGE UP (ref 8.4–10.5)
CHLORIDE SERPL-SCNC: 106 MMOL/L — SIGNIFICANT CHANGE UP (ref 98–107)
CO2 SERPL-SCNC: 22 MMOL/L — SIGNIFICANT CHANGE UP (ref 22–31)
CREAT SERPL-MCNC: 1.46 MG/DL — HIGH (ref 0.5–1.3)
GLUCOSE SERPL-MCNC: 125 MG/DL — HIGH (ref 70–99)
HCT VFR BLD CALC: 25.1 % — LOW (ref 39–50)
HCT VFR BLD CALC: 26.3 % — LOW (ref 39–50)
HCT VFR BLD CALC: 26.3 % — LOW (ref 39–50)
HCT VFR BLD CALC: 26.7 % — LOW (ref 39–50)
HCT VFR BLD CALC: 29.2 % — LOW (ref 39–50)
HGB BLD-MCNC: 7.9 G/DL — LOW (ref 13–17)
HGB BLD-MCNC: 8.3 G/DL — LOW (ref 13–17)
HGB BLD-MCNC: 8.6 G/DL — LOW (ref 13–17)
HGB BLD-MCNC: 8.7 G/DL — LOW (ref 13–17)
HGB BLD-MCNC: 9.8 G/DL — LOW (ref 13–17)
INR BLD: 1.46 — HIGH (ref 0.88–1.17)
LACTATE BLDA-SCNC: 0.9 MMOL/L — SIGNIFICANT CHANGE UP (ref 0.5–2)
MAGNESIUM SERPL-MCNC: 2.2 MG/DL — SIGNIFICANT CHANGE UP (ref 1.6–2.6)
MCHC RBC-ENTMCNC: 25.5 PG — LOW (ref 27–34)
MCHC RBC-ENTMCNC: 25.8 PG — LOW (ref 27–34)
MCHC RBC-ENTMCNC: 26.3 PG — LOW (ref 27–34)
MCHC RBC-ENTMCNC: 26.7 PG — LOW (ref 27–34)
MCHC RBC-ENTMCNC: 27.1 PG — SIGNIFICANT CHANGE UP (ref 27–34)
MCHC RBC-ENTMCNC: 31.1 % — LOW (ref 32–36)
MCHC RBC-ENTMCNC: 31.5 % — LOW (ref 32–36)
MCHC RBC-ENTMCNC: 32.7 % — SIGNIFICANT CHANGE UP (ref 32–36)
MCHC RBC-ENTMCNC: 33.1 % — SIGNIFICANT CHANGE UP (ref 32–36)
MCHC RBC-ENTMCNC: 33.6 % — SIGNIFICANT CHANGE UP (ref 32–36)
MCV RBC AUTO: 80.4 FL — SIGNIFICANT CHANGE UP (ref 80–100)
MCV RBC AUTO: 80.7 FL — SIGNIFICANT CHANGE UP (ref 80–100)
MCV RBC AUTO: 80.9 FL — SIGNIFICANT CHANGE UP (ref 80–100)
MCV RBC AUTO: 82 FL — SIGNIFICANT CHANGE UP (ref 80–100)
MCV RBC AUTO: 82.2 FL — SIGNIFICANT CHANGE UP (ref 80–100)
NRBC # FLD: 0 K/UL — SIGNIFICANT CHANGE UP (ref 0–0)
PHOSPHATE SERPL-MCNC: 3.3 MG/DL — SIGNIFICANT CHANGE UP (ref 2.5–4.5)
PLATELET # BLD AUTO: 141 K/UL — LOW (ref 150–400)
PLATELET # BLD AUTO: 144 K/UL — LOW (ref 150–400)
PLATELET # BLD AUTO: 156 K/UL — SIGNIFICANT CHANGE UP (ref 150–400)
PLATELET # BLD AUTO: 159 K/UL — SIGNIFICANT CHANGE UP (ref 150–400)
PLATELET # BLD AUTO: 167 K/UL — SIGNIFICANT CHANGE UP (ref 150–400)
PMV BLD: 8.8 FL — SIGNIFICANT CHANGE UP (ref 7–13)
PMV BLD: 8.9 FL — SIGNIFICANT CHANGE UP (ref 7–13)
PMV BLD: 9 FL — SIGNIFICANT CHANGE UP (ref 7–13)
PMV BLD: 9 FL — SIGNIFICANT CHANGE UP (ref 7–13)
PMV BLD: 9.4 FL — SIGNIFICANT CHANGE UP (ref 7–13)
POTASSIUM SERPL-MCNC: 3.8 MMOL/L — SIGNIFICANT CHANGE UP (ref 3.5–5.3)
POTASSIUM SERPL-SCNC: 3.8 MMOL/L — SIGNIFICANT CHANGE UP (ref 3.5–5.3)
PROT SERPL-MCNC: 5.8 G/DL — LOW (ref 6–8.3)
PROTHROM AB SERPL-ACNC: 16.8 SEC — HIGH (ref 9.8–13.1)
RBC # BLD: 3.06 M/UL — LOW (ref 4.2–5.8)
RBC # BLD: 3.25 M/UL — LOW (ref 4.2–5.8)
RBC # BLD: 3.26 M/UL — LOW (ref 4.2–5.8)
RBC # BLD: 3.27 M/UL — LOW (ref 4.2–5.8)
RBC # BLD: 3.61 M/UL — LOW (ref 4.2–5.8)
RBC # FLD: 15.3 % — HIGH (ref 10.3–14.5)
RBC # FLD: 15.3 % — HIGH (ref 10.3–14.5)
RBC # FLD: 15.4 % — HIGH (ref 10.3–14.5)
RBC # FLD: 15.5 % — HIGH (ref 10.3–14.5)
RBC # FLD: 15.6 % — HIGH (ref 10.3–14.5)
SARS-COV-2 IGG SERPL QL IA: NEGATIVE — SIGNIFICANT CHANGE UP
SARS-COV-2 IGM SERPL IA-ACNC: <0.1 INDEX — SIGNIFICANT CHANGE UP
SODIUM SERPL-SCNC: 141 MMOL/L — SIGNIFICANT CHANGE UP (ref 135–145)
WBC # BLD: 10.2 K/UL — SIGNIFICANT CHANGE UP (ref 3.8–10.5)
WBC # BLD: 7.7 K/UL — SIGNIFICANT CHANGE UP (ref 3.8–10.5)
WBC # BLD: 8.3 K/UL — SIGNIFICANT CHANGE UP (ref 3.8–10.5)
WBC # BLD: 8.59 K/UL — SIGNIFICANT CHANGE UP (ref 3.8–10.5)
WBC # BLD: 9.42 K/UL — SIGNIFICANT CHANGE UP (ref 3.8–10.5)
WBC # FLD AUTO: 10.2 K/UL — SIGNIFICANT CHANGE UP (ref 3.8–10.5)
WBC # FLD AUTO: 7.7 K/UL — SIGNIFICANT CHANGE UP (ref 3.8–10.5)
WBC # FLD AUTO: 8.3 K/UL — SIGNIFICANT CHANGE UP (ref 3.8–10.5)
WBC # FLD AUTO: 8.59 K/UL — SIGNIFICANT CHANGE UP (ref 3.8–10.5)
WBC # FLD AUTO: 9.42 K/UL — SIGNIFICANT CHANGE UP (ref 3.8–10.5)

## 2020-07-07 PROCEDURE — 37244 VASC EMBOLIZE/OCCLUDE BLEED: CPT

## 2020-07-07 PROCEDURE — 76937 US GUIDE VASCULAR ACCESS: CPT | Mod: 26

## 2020-07-07 PROCEDURE — 99291 CRITICAL CARE FIRST HOUR: CPT

## 2020-07-07 PROCEDURE — 36247 INS CATH ABD/L-EXT ART 3RD: CPT

## 2020-07-07 PROCEDURE — 36248 INS CATH ABD/L-EXT ART ADDL: CPT

## 2020-07-07 PROCEDURE — 99232 SBSQ HOSP IP/OBS MODERATE 35: CPT

## 2020-07-07 PROCEDURE — 93306 TTE W/DOPPLER COMPLETE: CPT | Mod: 26

## 2020-07-07 RX ADMIN — TAMSULOSIN HYDROCHLORIDE 0.4 MILLIGRAM(S): 0.4 CAPSULE ORAL at 21:41

## 2020-07-07 RX ADMIN — Medication 400 MILLIGRAM(S): at 13:21

## 2020-07-07 RX ADMIN — PANTOPRAZOLE SODIUM 40 MILLIGRAM(S): 20 TABLET, DELAYED RELEASE ORAL at 05:56

## 2020-07-07 RX ADMIN — SODIUM CHLORIDE 75 MILLILITER(S): 9 INJECTION, SOLUTION INTRAVENOUS at 21:41

## 2020-07-07 RX ADMIN — ATORVASTATIN CALCIUM 40 MILLIGRAM(S): 80 TABLET, FILM COATED ORAL at 21:41

## 2020-07-07 RX ADMIN — FINASTERIDE 5 MILLIGRAM(S): 5 TABLET, FILM COATED ORAL at 12:24

## 2020-07-07 RX ADMIN — Medication 400 MILLIGRAM(S): at 05:54

## 2020-07-07 RX ADMIN — Medication 400 MILLIGRAM(S): at 00:00

## 2020-07-07 RX ADMIN — AMIODARONE HYDROCHLORIDE 200 MILLIGRAM(S): 400 TABLET ORAL at 05:54

## 2020-07-07 RX ADMIN — Medication 60 MILLIGRAM(S): at 05:53

## 2020-07-07 NOTE — PROGRESS NOTE ADULT - ASSESSMENT
Patient is a 75 year old male with a PMHx of HTN, CHF, HLD, AFib (on Eliquis), BPH, liver cysts and past surgical history of cardiac stent x1, colon resection for colon carcinoma (unclear from patient history what was resected) and an umbilical hernia repair with mesh who presented with weakness, hypotension and bradycardia.  CTA Chest / Abdomen / Pelvis performed on 7/6/20 showed no aortic dissection.  Multiple left hepatic lobe cysts with a complex high attenuation / cystic masslike lesion along the inferior edge of the left hepatic lobe that appears to blend in with mildly high intraperitoneal fluid.      PLAN:  - Monitor CBC q4 hours and transfuse PRN  - If inappropriate response to PRBC will require IR intervention  - NPO   - LR @75cc/hr  - Care per SICU      #[id number]  D Team Surgery

## 2020-07-07 NOTE — CONSULT NOTE ADULT - SUBJECTIVE AND OBJECTIVE BOX
CHIEF COMPLAINT:Patient is a 75y old  Male who presents with a chief complaint of Hemoperitoneum (07 Jul 2020 07:35)      HISTORY OF PRESENT ILLNESS:    75 male wit history of cad s/p stents ?4-6 months ago , PAF s/p DCCV on Eliquis, HTN, CHF , liver cysts s/p colon resection for colon ca, presented with weakness, hypotension and bradycardia  Upon arriving to the ED, patient hemoglobin was 8.0 and he was hypotensive.  CT scan performed that showed a ruptured multiloculated liver cyst with hemoperitoneum.   cardiology is called for meds management   pt now feels better  denies any chest pain, sob, palpitation, dizziness or syncope.    PAST MEDICAL & SURGICAL HISTORY:  Benign prostate hyperplasia  Hyperlipidemia  Hypertension  H/O ventral hernia repair  S/P colon resection          MEDICATIONS:  aMIOdarone    Tablet 200 milliGRAM(s) Oral two times a day  furosemide    Tablet 60 milliGRAM(s) Oral daily  metoprolol tartrate 25 milliGRAM(s) Oral two times a day  tamsulosin 0.4 milliGRAM(s) Oral at bedtime        acetaminophen  IVPB .. 1000 milliGRAM(s) IV Intermittent every 6 hours  HYDROmorphone  Injectable 0.5 milliGRAM(s) IV Push every 4 hours PRN  HYDROmorphone  Injectable 1 milliGRAM(s) IV Push every 4 hours PRN    pantoprazole    Tablet 40 milliGRAM(s) Oral before breakfast    atorvastatin 40 milliGRAM(s) Oral at bedtime  finasteride 5 milliGRAM(s) Oral daily    lactated ringers. 1000 milliLiter(s) IV Continuous <Continuous>      FAMILY HISTORY:  Family history of breast cancer      Non-contributory    SOCIAL HISTORY:    No tobacco, drugs or etoh    Allergies    penicillin (Swelling)    Intolerances    	    REVIEW OF SYSTEMS:  as above  The rest of the 14 points ROS reviewed and except above they are unremarkable.        PHYSICAL EXAM:  T(C): 36.4 (07-07-20 @ 04:00), Max: 37.1 (07-06-20 @ 16:30)  HR: 61 (07-07-20 @ 07:00) (49 - 76)  BP: 145/72 (07-07-20 @ 07:00) (104/51 - 148/62)  RR: 23 (07-07-20 @ 07:00) (10 - 24)  SpO2: 96% (07-07-20 @ 07:00) (94% - 100%)  Wt(kg): --  I&O's Summary    06 Jul 2020 07:01  -  07 Jul 2020 07:00  --------------------------------------------------------  IN: 1570 mL / OUT: 925 mL / NET: 645 mL      JVP: Normal  Neck: supple  Lung: clear   CV: S1 S2 , Murmur:  Abd: soft  Ext: No edema  neuro: Awake / alert  Psych: flat affect  Skin: normal      LABS/DATA:    TELEMETRY: 	    ECG:  	< from: 12 Lead ECG (07.06.20 @ 06:12) >    Ventricular Rate 57 BPM    Atrial Rate 57 BPM    P-R Interval 180 ms    QRS Duration 98 ms    Q-T Interval 494 ms    QTC Calculation(Bezet) 480 ms    P Axis 45 degrees    R Axis -35 degrees    T Axis 116 degrees    Diagnosis Line Sinus bradycardia  Left axis deviation  Cannot rule out Anterior infarct , age undetermined  Abnormal ECG    < end of copied text >     	  CARDIAC MARKERS:                                      8.6    8.30  )-----------( 141      ( 07 Jul 2020 07:20 )             26.3     07-07    141  |  106  |  32<H>  ----------------------------<  125<H>  3.8   |  22  |  1.46<H>    Ca    8.6      07 Jul 2020 00:10  Phos  3.3     07-07  Mg     2.2     07-07    TPro  5.8<L>  /  Alb  3.2<L>  /  TBili  1.0  /  DBili  x   /  AST  19  /  ALT  27  /  AlkPhos  55  07-07    proBNP:   Lipid Profile:   HgA1c:   TSH:

## 2020-07-07 NOTE — CONSULT NOTE ADULT - ASSESSMENT
Anemia  ruptured liver cyst   Monitor hemoglobin, transfuse as needed.  off a/c    PAF  s/p DCCV  in sinus  on amio and BB  off a/c given liver cyst rupture     CAD history of recent stent  please obtain records if possible  would avoid Brilinta in future with noac   can start low dose asa and observe  cont statin     CHF unspecified  obtain echo      Advanced care planning was discussed with patient and family.  Risks, benefits and alternatives of the cardiac treatments and medical therapy including procedures were discussed in detail and all questions were answered. Importance of compliance with medical therapy and lifestyle modification to improve cardiovascular health were addressed. Appropriate forms and patient educational materials were reviewed. 30 minutes face to face spent.
ASSESSMENT:  75y Male PMH HTN, HLD, CAD with stent, afib (on Eliquis) BPH s/p TURP and large liver cysts, now with hemoperitoneum from liver cyst rupture.     PLAN:   Neurologic: AOx4  - Pain control with IV tylenol, dilaudid for break through    Respiratory:   - Satting well on Room air    Cardiovascular:   - Metoprolol 50mg daily  - Atorvastatin 20mg daily  - losartan 100mg daily holding    Gastrointestinal/Nutrition: NPO  - omeprazole 40 daily  - NPO     Renal/Genitourinary: LR 75  - Lasix 60 daily   - finasteride 5mg daily  - Terazosin 5mg daily    Hematologic:   - Brilinta 90mg BID holding   - Eliquis 5 mg BID holding   - H/H stable trending q6    Infectious Disease:   - No issues     Endocrine:   - No issues     Disposition: SICU    Lines/Tubes: PIV    Discussed with Dr. Charlie Cabral MD  General Surgery, PGY 2

## 2020-07-07 NOTE — PROGRESS NOTE ADULT - ASSESSMENT
75y Male PMH HTN, HLD, CAD with stent, afib (on Eliquis) BPH s/p TURP and large liver cysts, now with hemoperitoneum from liver cyst rupture s/p 2 units PRBC 7/6, 3 units of PRBC 1 U FFP 1U Plasma transfused, he went to IR and had a Hepatic angiogram with left hepatic and middle hepatic artery embolization. Patient seen he is without complains. He denies pain, dizziness, nausea and vomiting.    PLAN:   Neurologic: AOx4  - Pain control with IV tylenol, Dilaudid for break through    Respiratory:   - Satting well on Room air    Cardiovascular:   - Metoprolol 50mg daily  - Atorvastatin 20mg daily  - losartan 100mg daily holding    Gastrointestinal/Nutrition: NPO  - omeprazole 40 daily  - NPO   - FAST exam positive revealing fluid in Morrisons pouch s/p embolization of  left hepatic and middle hepatic artery    Renal/Genitourinary: LR 75  - Home lasix 60 daily being held, PRN for fluid overload now  - finasteride 5mg daily  - Terazosin 5mg daily    Hematologic:   - Brilinta 90mg BID holding   - Eliquis 5 mg BID holding   - H/H trending Q6  - q 8 hour CBC    Infectious Disease:   - No issues     Endocrine:   - No issues     Disposition: SICU    Lines/Tubes: PIV 75y Male PMH HTN, HLD, CAD with stent, afib (on Eliquis) BPH s/p TURP and large liver cysts, now with hemoperitoneum from liver cyst rupture s/p 2 units PRBC 7/6, 3 units of PRBC 1 U FFP 1U Plasma transfused, he went to IR and had a Hepatic angiogram with left hepatic and middle hepatic artery embolization today    PLAN:   Neurologic: AOx4  - Pain control with IV tylenol, Dilaudid for break through    Respiratory:   - Satting well on Room air    Cardiovascular:   - Metoprolol 50mg daily  - Atorvastatin 20mg daily  - losartan 100mg daily holding    Gastrointestinal/Nutrition: NPO  - omeprazole 40 daily  - NPO   - FAST exam positive revealing fluid in Morrisons pouch s/p embolization of  left hepatic and middle hepatic artery    Renal/Genitourinary: LR 75  - Home lasix 60 daily being held, PRN for fluid overload now  - finasteride 5mg daily  - Terazosin 5mg daily    Hematologic:   - Brilinta 90mg BID holding   - Eliquis 5 mg BID holding   - H/H trending Q6  - q 8 hour CBC    Infectious Disease:   - No issues     Endocrine:   - No issues     Disposition: SICU    Lines/Tubes: PIV

## 2020-07-07 NOTE — PROGRESS NOTE ADULT - SUBJECTIVE AND OBJECTIVE BOX
SICU Morning Progress Note       Interval/Overnight Events:     Patient got 1 u packed cells yesterday am, pm cbc did not reveal an appropriate response, he was ordered for an additional unit at 9 PM. Patient had a fast scan via us which revealed free fluid in both left and right upper quadrants    HPI:  75M w/ PMH HTN, CHF, HLD, afib (on Eliquis) BPH and liver cysts, and past surgical history of cardiac stent x1 3 months ago, colon resection for colon carcinoma (unclear from patient history what was resected) and an umbilical hernia repair with mesh presents with weakness, hypotension, bradycardia. Pt states has felt weak, while on the toilet this morning. Per EMS daughter reported pt's SBP was 70 at home and he was diaphoretic.  Upon arriving to the ED patient hbg was 8.0 and he was hypotensive. Patient received CT scan showing a ruptured multiloculated liver cyst with hemoperitoneum. Patient states that he has a long history of liver cysts and periodically has them assessed. . Patient is complaining of abdominal and back pain. He denies any pain nausea, vomiting, chest pain or SOB. States he took his BP medications this am. Patient is a social drinking and claims to have had a pack of cigarettes daily for the last 8 years.  Allergies: penicillin (Swelling)    MEDICATIONS:   Neurologic Medications  acetaminophen  IVPB .. 1000 milliGRAM(s) IV Intermittent every 6 hours  HYDROmorphone  Injectable 0.5 milliGRAM(s) IV Push every 4 hours PRN Moderate Pain (4 - 6)  HYDROmorphone  Injectable 1 milliGRAM(s) IV Push every 4 hours PRN Severe Pain (7 - 10  Respiratory Medications  Cardiovascular Medications  aMIOdarone    Tablet 200 milliGRAM(s) Oral two times a day  furosemide    Tablet 60 milliGRAM(s) Oral daily  metoprolol tartrate 25 milliGRAM(s) Oral two times a day  tamsulosin 0.4 milliGRAM(s) Oral at bedtime  Gastrointestinal Medications  lactated ringers. 1000 milliLiter(s) IV Continuous <Continuous>  pantoprazole    Tablet 40 milliGRAM(s) Oral before breakfast  Genitourinary Medications  Hematologic/Oncologic Medications  Antimicrobial/Immunologic Medications  Endocrine/Metabolic Medications  atorvastatin 40 milliGRAM(s) Oral at bedtime  finasteride 5 milliGRAM(s) Oral daily  Topical/Other Medications    VITAL SIGNS, INS/OUTS (last 24 hours):  ((Insert SICU Vitals/Is+Os here))***  EXAM  NEUROLOGY  Exam: Awake and alert NAD  RESPIRATORY  Exam: Lungs clear to auscultation, Normal expansion/effort.   CARDIOVASCULAR  Exam: S1, S2.  Regular rate and rhythm.    GI/NUTRITION  Exam: Abdomen obese, softly distended. FAST + Right and Left UQ  VASCULAR  Exam: Extremities warm, pink, well-perfused.   METABOLIC/FLUIDS/ELECTROLYTES lactated ringers. 1000 milliLiter(s) IV Continuous <Continuous>  HEMATOLOGIC [x] VTE Prophylaxis:   Transfusions:	[] PRBC	[] Platelets		[] FFP	[] Cryoprecipitate  INFECTIOUS DISEASE  Antimicrobials/Immunologic Medications:  Tubes/Lines/Drains    [x] Peripheral IV  [] Central Venous Line     	[] R	[] L	[] IJ	[] Fem	[] SC	Date Placed:   [] Arterial Line		[] R	[] L	[] Fem	[] Rad	[] Ax	Date Placed:   [] PICC		[] Midline		[] Mediport  [] Urinary Catheter		Date Placed:   [x] Necessity of urinary, arterial, and venous catheters discussed  LABS  --------------------------------------------------------------------------------------  ((Insert SICU Labs here))*** SICU Morning Progress Note       Interval/Overnight Events:     Patient got 1 u packed cells yesterday am, pm cbc did not reveal an appropriate response, he was ordered for an additional unit at 9 PM. Patient had a fast scan via us which revealed free fluid in both left and right upper quadrant; given an additional 2 units overnight and 1 this morning. Total 4U PRBCs    HPI:  75M w/ PMH HTN, CHF, HLD, afib (on Eliquis) BPH and liver cysts, and past surgical history of cardiac stent x1 3 months ago, colon resection for colon carcinoma (unclear from patient history what was resected) and an umbilical hernia repair with mesh presents with weakness, hypotension, bradycardia. Pt states has felt weak, while on the toilet this morning. Per EMS daughter reported pt's SBP was 70 at home and he was diaphoretic.  Upon arriving to the ED patient hbg was 8.0 and he was hypotensive. Patient received CT scan showing a ruptured multiloculated liver cyst with hemoperitoneum. Patient states that he has a long history of liver cysts and periodically has them assessed. . Patient is complaining of abdominal and back pain. He denies any pain nausea, vomiting, chest pain or SOB. States he took his BP medications this am. Patient is a social drinking and claims to have had a pack of cigarettes daily for the last 8 years.  Allergies: penicillin (Swelling)    MEDICATIONS:   Neurologic Medications  acetaminophen  IVPB .. 1000 milliGRAM(s) IV Intermittent every 6 hours  HYDROmorphone  Injectable 0.5 milliGRAM(s) IV Push every 4 hours PRN Moderate Pain (4 - 6)  HYDROmorphone  Injectable 1 milliGRAM(s) IV Push every 4 hours PRN Severe Pain (7 - 10  Respiratory Medications  Cardiovascular Medications  aMIOdarone    Tablet 200 milliGRAM(s) Oral two times a day  furosemide    Tablet 60 milliGRAM(s) Oral daily  metoprolol tartrate 25 milliGRAM(s) Oral two times a day  tamsulosin 0.4 milliGRAM(s) Oral at bedtime  Gastrointestinal Medications  lactated ringers. 1000 milliLiter(s) IV Continuous <Continuous>  pantoprazole    Tablet 40 milliGRAM(s) Oral before breakfast  Genitourinary Medications  Hematologic/Oncologic Medications  Antimicrobial/Immunologic Medications  Endocrine/Metabolic Medications  atorvastatin 40 milliGRAM(s) Oral at bedtime  finasteride 5 milliGRAM(s) Oral daily  Topical/Other Medications    VITAL SIGNS, INS/OUTS (last 24 hours):  ((Insert SICU Vitals/Is+Os here))***  EXAM  NEUROLOGY  Exam: Awake and alert NAD  RESPIRATORY  Exam: Lungs clear to auscultation, Normal expansion/effort.   CARDIOVASCULAR  Exam: S1, S2.  Regular rate and rhythm.    GI/NUTRITION  Exam: Abdomen obese, softly distended. FAST + Right and Left UQ  VASCULAR  Exam: Extremities warm, pink, well-perfused.   METABOLIC/FLUIDS/ELECTROLYTES lactated ringers. 1000 milliLiter(s) IV Continuous <Continuous>  HEMATOLOGIC [x] VTE Prophylaxis:   Transfusions:	[] PRBC	[] Platelets		[] FFP	[] Cryoprecipitate  INFECTIOUS DISEASE  Antimicrobials/Immunologic Medications:  Tubes/Lines/Drains    [x] Peripheral IV  [] Central Venous Line     	[] R	[] L	[] IJ	[] Fem	[] SC	Date Placed:   [] Arterial Line		[] R	[] L	[] Fem	[] Rad	[] Ax	Date Placed:   [] PICC		[] Midline		[] Mediport  [] Urinary Catheter		Date Placed:   [x] Necessity of urinary, arterial, and venous catheters discussed  LABS  --------------------------------------------------------------------------------------  ((Insert SICU Labs here))***

## 2020-07-07 NOTE — CHART NOTE - NSCHARTNOTEFT_GEN_A_CORE
CAPRINI SCORE [CLOT]    AGE RELATED RISK FACTORS                                                       MOBILITY RELATED FACTORS  [ ] Age 41-60 years                                            (1 Point)                  [ ] Bed rest                                                        (1 Point)  [ ] Age: 61-74 years                                           (2 Points)                 [ ] Plaster cast                                                   (2 Points)  [3 ] Age= 75 years                                              (3 Points)                 [ ] Bed bound for more than 72 hours                 (2 Points)    DISEASE RELATED RISK FACTORS                                               GENDER SPECIFIC FACTORS  [ ] Edema in the lower extremities                       (1 Point)                  [ ] Pregnancy                                                     (1 Point)  [ ] Varicose veins                                               (1 Point)                  [ ] Post-partum < 6 weeks                                   (1 Point)             [1 ] BMI > 25 Kg/m2                                            (1 Point)                  [ ] Hormonal therapy  or oral contraception          (1 Point)                 [ ] Sepsis (in the previous month)                        (1 Point)                  [ ] History of pregnancy complications                 (1 point)  [ ] Pneumonia or serious lung disease                                               [ ] Unexplained or recurrent                     (1 Point)           (in the previous month)                               (1 Point)  [ ] Abnormal pulmonary function test                     (1 Point)                 SURGERY RELATED RISK FACTORS  [ ] Acute myocardial infarction                              (1 Point)                 [ ]  Section                                             (1 Point)  [ ] Congestive heart failure (in the previous month)  (1 Point)               [ ] Minor surgery                                                  (1 Point)   [ ] Inflammatory bowel disease                             (1 Point)                 [ ] Arthroscopic surgery                                        (2 Points)  [ ] Central venous access                                      (2 Points)                [ ] General surgery lasting more than 45 minutes   (2 Points)       [ ] Stroke (in the previous month)                          (5 Points)               [ ] Elective arthroplasty                                         (5 Points)                                                                                                                                               HEMATOLOGY RELATED FACTORS                                                 TRAUMA RELATED RISK FACTORS  [ ] Prior episodes of VTE                                     (3 Points)                [ ] Fracture of the hip, pelvis, or leg                       (5 Points)  [ ] Positive family history for VTE                         (3 Points)                 [ ] Acute spinal cord injury (in the previous month)  (5 Points)  [ ] Prothrombin 83220 A                                     (3 Points)                 [ ] Paralysis  (less than 1 month)                             (5 Points)  [ ] Factor V Leiden                                             (3 Points)                  [ ] Multiple Trauma within 1 month                        (5 Points)  [ ] Lupus anticoagulants                                     (3 Points)                                                           [ ] Anticardiolipin antibodies                               (3 Points)                                                       [ ] High homocysteine in the blood                      (3 Points)                                             [ ] Other congenital or acquired thrombophilia      (3 Points)                                                [ ] Heparin induced thrombocytopenia                  (3 Points)                                          Total Score [      4    ]    Caprini Score 0 - 2:  Low Risk, No VTE Prophylaxis required for most patients, encourage ambulation  Caprini Score 3 - 6:  At Risk, pharmacologic VTE prophylaxis is indicated for most patients (in the absence of a contraindication)  Caprini Score Greater than or = 7:  High Risk, pharmacologic VTE prophylaxis is indicated for most patients (in the absence of a contraindication)

## 2020-07-07 NOTE — PROGRESS NOTE ADULT - SUBJECTIVE AND OBJECTIVE BOX
Evening ICU NOTE  Patient is a 75y old  Male who presents with a chief complaint of Hemoperitoneum (07 Jul 2020 19:52)    SUBJECTIVE: Patient seen and examined at bedside with surgical team, today he had 3 units of PRBC 1 U FFP 1U Plasma transfused, he went to IR and had a Hepatic angiogram with left hepatic and middle hepatic artery embolization. Patient seen he is without complains. He denies pain, dizziness, nausea and vomiting.    Vital Signs Last 24 Hrs  T(C): 36 (07 Jul 2020 20:20), Max: 37.3 (07 Jul 2020 17:00)  T(F): 96.8 (07 Jul 2020 20:20), Max: 99.1 (07 Jul 2020 17:00)  HR: 56 (07 Jul 2020 21:00) (47 - 63)  BP: 139/62 (07 Jul 2020 21:00) (103/87 - 146/42)  BP(mean): 81 (07 Jul 2020 21:00) (60 - 94)  RR: 25 (07 Jul 2020 21:00) (9 - 30)  SpO2: 91% (07 Jul 2020 21:00) (91% - 98%)I&O's Detail  07 Jul 2020 07:01  -  07 Jul 2020 21:05  --------------------------------------------------------  IN:    IV PiggyBack: 100 mL    lactated ringers.: 532.5 mL    Packed Red Blood Cells: 600 mL    Plasma: 300 mL    Platelets - Single Donor: 205 mL  Total IN: 1737.5 mL    OUT:    Indwelling Catheter - Urethral: 780 mL    Voided: 975 mL  Total OUT: 1755 mL  Total NET: -17.5 mL    Medications  MEDICATIONS  (STANDING):  aMIOdarone    Tablet 200 milliGRAM(s) Oral two times a day  atorvastatin 40 milliGRAM(s) Oral at bedtime  finasteride 5 milliGRAM(s) Oral daily  lactated ringers. 1000 milliLiter(s) (75 mL/Hr) IV Continuous <Continuous>  metoprolol tartrate 25 milliGRAM(s) Oral two times a day  pantoprazole    Tablet 40 milliGRAM(s) Oral before breakfast  tamsulosin 0.4 milliGRAM(s) Oral at bedtime  MEDICATIONS  (PRN):  HYDROmorphone  Injectable 0.5 milliGRAM(s) IV Push every 4 hours PRN Moderate Pain (4 - 6)  HYDROmorphone  Injectable 1 milliGRAM(s) IV Push every 4 hours PRN Severe Pain (7 - 10)    Physical Exam  Constitutional: A&Ox3, NAD  Respiratory: unlabored  Gastrointestinal: Softly distended, obese, nontender, groin soft    LABS:                        8.7    7.70  )-----------( 156      ( 07 Jul 2020 14:25 )             26.3   07-07  141  |  106  |  32<H>  ----------------------------<  125<H>  3.8   |  22  |  1.46<H>  Ca    8.6      07 Jul 2020 00:10  Phos  3.3     07-07  Mg     2.2     07-07  TPro  5.8<L>  /  Alb  3.2<L>  /  TBili  1.0  /  DBili  x   /  AST  19  /  ALT  27  /  AlkPhos  55  07-07  PT/INR - ( 07 Jul 2020 00:10 )   PT: 16.8 SEC;   INR: 1.46     PTT - ( 07 Jul 2020 00:10 )  PTT:32.5 SEC  LIVER FUNCTIONS - ( 07 Jul 2020 00:10 )  Alb: 3.2 g/dL / Pro: 5.8 g/dL / ALK PHOS: 55 u/L / ALT: 27 u/L / AST: 19 u/L / GGT: x

## 2020-07-07 NOTE — CHART NOTE - NSCHARTNOTEFT_GEN_A_CORE
LIMITED BEDSIDE ULTRASOUND -- FAST EXAM:    RUQ/Aranda's pouch: ++ moderate   LUQ/Spleno/Renal pouch: ++moderate  Pelvis: --     Interpretation: Positive FAST exam    Citlalli Manrique PA-C

## 2020-07-07 NOTE — PROGRESS NOTE ADULT - SUBJECTIVE AND OBJECTIVE BOX
Surgery Daily Progress Note  =====================================================  Interval / Overnight Events: Required 3 units of PRBC overnight with inappropriate response.      HPI:  Patient is a 75 year old male with a PMHx of HTN, CHF, HLD, AFib (on Eliquis), BPH, liver cysts and past surgical history of cardiac stent x1, colon resection for colon carcinoma (unclear from patient history what was resected) and an umbilical hernia repair with mesh who presented with weakness, hypotension and bradycardia.  Patient states that he felt weak while on the toilet this morning.  Per EMS, daughter reported patient's SBP was 70 at home and he was diaphoretic.      Upon arriving to the ED, patient hemoglobin was 8.0 and he was hypotensive.  CT scan performed that showed a ruptured multiloculated liver cyst with hemoperitoneum.  Patient states that he has a long history of liver cysts and periodically has them assessed.  He was complaining of abdominal and back pain. (06 Jul 2020 12:44)      PAST MEDICAL & SURGICAL HISTORY:  Benign prostate hyperplasia  Hyperlipidemia  Hypertension  H/O ventral hernia repair  S/P colon resection      ALLERGIES:  penicillin (Swelling)    --------------------------------------------------------------------------------------    MEDICATIONS:    Neurologic Medications  acetaminophen  IVPB .. 1000 milliGRAM(s) IV Intermittent every 6 hours  HYDROmorphone  Injectable 0.5 milliGRAM(s) IV Push every 4 hours PRN Moderate Pain (4 - 6)  HYDROmorphone  Injectable 1 milliGRAM(s) IV Push every 4 hours PRN Severe Pain (7 - 10)    Cardiovascular Medications  aMIOdarone    Tablet 200 milliGRAM(s) Oral two times a day  furosemide    Tablet 60 milliGRAM(s) Oral daily  metoprolol tartrate 25 milliGRAM(s) Oral two times a day  tamsulosin 0.4 milliGRAM(s) Oral at bedtime    Gastrointestinal Medications  lactated ringers. 1000 milliLiter(s) IV Continuous <Continuous>  pantoprazole    Tablet 40 milliGRAM(s) Oral before breakfast    Endocrine/Metabolic Medications  atorvastatin 40 milliGRAM(s) Oral at bedtime  finasteride 5 milliGRAM(s) Oral daily    --------------------------------------------------------------------------------------    VITAL SIGNS:  T(C): 36.4 (07 Jul 2020 04:00), Max: 37.1 (06 Jul 2020 16:30)  T(F): 97.6 (07 Jul 2020 04:00), Max: 98.7 (06 Jul 2020 16:30)  HR: 60 (07 Jul 2020 06:00) (49 - 76)  BP: 140/73 (07 Jul 2020 06:00) (91/35 - 148/62)  BP(mean): 89 (07 Jul 2020 06:00) (65 - 89)  RR: 22 (07 Jul 2020 06:00) (10 - 24)  SpO2: 98% (07 Jul 2020 06:00) (94% - 100%)    --------------------------------------------------------------------------------------    INS AND OUTS:    06 Jul 2020 07:01  -  07 Jul 2020 07:00  --------------------------------------------------------  IN:    IV PiggyBack: 50 mL    lactated ringers.: 900 mL    Packed Red Blood Cells: 620 mL  Total IN: 1570 mL    OUT:    Voided: 925 mL  Total OUT: 925 mL    Total NET: 645 mL    --------------------------------------------------------------------------------------    EXAM    NEUROLOGY  Exam: Normal, in no acute distress.  Alert and oriented x4.  No focal neurologic deficits.    HEENT  Exam: Normocephalic, atraumatic.    RESPIRATORY  Exam: Normal expansion / effort.    CARDIOVASCULAR  Exam: S1, S2.  Regular rate and rhythm.    GI/NUTRITION  Exam: Abdomen softly distended.  Non-tender.  Current Diet: NPO    VASCULAR  Exam: Extremities warm, pink, well-perfused.    MUSCULOSKELETAL  Exam: All extremities moving spontaneously without limitations.      METABOLIC / FLUIDS / ELECTROLYTES  lactated ringers. 1000 milliLiter(s) IV Continuous <Continuous>    --------------------------------------------------------------------------------------    LABS    CBC:                      7.9    9.42  )-----------( 167      ( 07 Jul 2020 02:15 )             25.1     CHEM:  141  |  106  |  32<H>  ----------------------------<  125<H>  3.8   |  22  |  1.46<H>    Ca    8.6      07 Jul 2020 00:10  Phos  3.3     07-07  Mg     2.2     07-07    TPro  5.8<L>  /  Alb  3.2<L>  /  TBili  1.0  /  DBili  x   /  AST  19  /  ALT  27  /  AlkPhos  55  07-07    --------------------------------------------------------------------------------------

## 2020-07-07 NOTE — PROGRESS NOTE ADULT - SUBJECTIVE AND OBJECTIVE BOX
Interventional Radiology Brief- Operative Note    Procedure: Hepatic angiogram with left hepatic and middle hepatic artery embolization    Operators: Angella Grubbs    Anesthesia (type): IV sedation    Contrast: 100 cc Omni 350    EBL: Minimal    Findings/Follow up Plan of Care: No evidence for contrast extravasation. Successful embolization of left hepatic artery and middle hepatic artery using gelfoam.     Specimens Removed: None    Implants: None    Complications: None    Condition/Disposition: Stable/SICU    Please call Interventional Radiology x [phone] with any questions, concerns, or issues.

## 2020-07-07 NOTE — CHART NOTE - NSCHARTNOTEFT_GEN_A_CORE
PRE-INTERVENTIONAL RADIOLOGY PROCEDURE NOTE      Patient Age: 63    Patient Gender: Male    Procedure: IR embolization of hemorrhagic hepatic cyst    Diagnosis/Indication: Hemoperitoneum, ruptured hepatic cyst    Interventional Radiology Attending Physician: Dr. Reyes    Ordering Attending Physician: Dr. Guerra    Pertinent Medical History: Afib on Eliquis and Brillinta    Pertinent labs:                      8.6    8.30  )-----------( 141      ( 07 Jul 2020 07:20 )             26.3       07-07    141  |  106  |  32<H>  ----------------------------<  125<H>  3.8   |  22  |  1.46<H>    Ca    8.6      07 Jul 2020 00:10  Phos  3.3     07-07  Mg     2.2     07-07    TPro  5.8<L>  /  Alb  3.2<L>  /  TBili  1.0  /  DBili  x   /  AST  19  /  ALT  27  /  AlkPhos  55  07-07      PT/INR - ( 07 Jul 2020 00:10 )   PT: 16.8 SEC;   INR: 1.46          PTT - ( 07 Jul 2020 00:10 )  PTT:32.5 SEC        Patient and Family Aware ? Yes PRE-INTERVENTIONAL RADIOLOGY PROCEDURE NOTE      Patient Age: 63    Patient Gender: Male    Procedure: IR embolization of hemorrhagic hepatic cyst    Diagnosis/Indication: Hemoperitoneum, ruptured hepatic cyst    Interventional Radiology Attending Physician: Dr. Lim    Ordering Attending Physician: Dr. Guerra    Pertinent Medical History: Afib on Eliquis and Brillinta    Pertinent labs:                      8.6    8.30  )-----------( 141      ( 07 Jul 2020 07:20 )             26.3       07-07    141  |  106  |  32<H>  ----------------------------<  125<H>  3.8   |  22  |  1.46<H>    Ca    8.6      07 Jul 2020 00:10  Phos  3.3     07-07  Mg     2.2     07-07    TPro  5.8<L>  /  Alb  3.2<L>  /  TBili  1.0  /  DBili  x   /  AST  19  /  ALT  27  /  AlkPhos  55  07-07      PT/INR - ( 07 Jul 2020 00:10 )   PT: 16.8 SEC;   INR: 1.46          PTT - ( 07 Jul 2020 00:10 )  PTT:32.5 SEC        Patient and Family Aware ? Yes

## 2020-07-07 NOTE — PROGRESS NOTE ADULT - ASSESSMENT
75y Male PMH HTN, HLD, CAD with stent, afib (on Eliquis) BPH s/p TURP and large liver cysts, now with hemoperitoneum from liver cyst rupture s/p 2 U PRBC 7/6    PLAN:   Neurologic: AOx4  - Pain control with IV tylenol, dilaudid for break through    Respiratory:   - Satting well on Room air    Cardiovascular:   - Metoprolol 50mg daily  - Atorvastatin 20mg daily  - losartan 100mg daily holding    Gastrointestinal/Nutrition: NPO  - omeprazole 40 daily  - NPO     Renal/Genitourinary: LR 75  - Lasix 60 daily   - finasteride 5mg daily  - Terazosin 5mg daily    Hematologic:   - Brilinta 90mg BID holding   - Eliquis 5 mg BID holding   - H/H stable trending q6    Infectious Disease:   - No issues     Endocrine:   - No issues     Disposition: SICU    Lines/Tubes: PIV 75y Male PMH HTN, HLD, CAD with stent, afib (on Eliquis) BPH s/p TURP and large liver cysts, now with hemoperitoneum from liver cyst rupture s/p 4 total units   PLAN:   Neurologic: AOx4  - Pain control with IV tylenol, dilaudid for break through    Respiratory:   - Satting well on Room air    Cardiovascular:   - Metoprolol 50mg daily  - Atorvastatin 20mg daily  - losartan 100mg daily holding    Gastrointestinal/Nutrition: NPO  - omeprazole 40 daily  - NPO   - FAST exam positive revealing fluid in Morrisons pouch    Renal/Genitourinary: LR 75  - Home lasix 60 daily being held, PRN for fluid overload now  - finasteride 5mg daily  - Terazosin 5mg daily    Hematologic:   - Brilinta 90mg BID holding   - Eliquis 5 mg BID holding   - H/H trending Q6  - 4U PRBCs given with transient response between each one     Infectious Disease:   - No issues     Endocrine:   - No issues     Disposition: SICU    Lines/Tubes: PIV

## 2020-07-07 NOTE — PROGRESS NOTE ADULT - SUBJECTIVE AND OBJECTIVE BOX
Vascular & Interventional Radiology Pre-Procedure Note    Procedure Name: Hepatic angiogram with embolization.    HPI: 75y Male with hemoperitoneum from likely ruptured hepatic cysts. Angiogram with embolization is requested.    Allergies: penicillin (Swelling)    Medications (Abx/Cardiac/Anticoagulation/Blood Products)  aMIOdarone    Tablet: 200 milliGRAM(s) Oral (07-07 @ 05:54)  furosemide    Tablet: 60 milliGRAM(s) Oral (07-07 @ 05:53)    Data:    T(C): 37.1  HR: 55  BP: 131/87  RR: 25  SpO2: 93%    -WBC 7.70 / HgB 8.7 / Hct 26.3 / Plt 156  -Na 141 / Cl 106 / BUN 32 / Glucose 125  -K 3.8 / CO2 22 / Cr 1.46  -ALT 27 / Alk Phos 55 / T.Bili 1.0  -INR1.46    Plan:   -75y Male presents for hepatic angiogram and embolization. I discussed the procedure and risks including the risks of liver dysfunction/failure, infection, and bleeding and the patient was agreeable to proceed.   -Risks/Benefits/alternatives explained with the patient and/or healthcare proxy and witnessed informed consent obtained.

## 2020-07-08 LAB
ALBUMIN SERPL ELPH-MCNC: 3.1 G/DL — LOW (ref 3.3–5)
ALBUMIN SERPL ELPH-MCNC: 3.3 G/DL — SIGNIFICANT CHANGE UP (ref 3.3–5)
ALP SERPL-CCNC: 58 U/L — SIGNIFICANT CHANGE UP (ref 40–120)
ALP SERPL-CCNC: 60 U/L — SIGNIFICANT CHANGE UP (ref 40–120)
ALT FLD-CCNC: 34 U/L — SIGNIFICANT CHANGE UP (ref 4–41)
ALT FLD-CCNC: 39 U/L — SIGNIFICANT CHANGE UP (ref 4–41)
ANION GAP SERPL CALC-SCNC: 10 MMO/L — SIGNIFICANT CHANGE UP (ref 7–14)
ANION GAP SERPL CALC-SCNC: 9 MMO/L — SIGNIFICANT CHANGE UP (ref 7–14)
AST SERPL-CCNC: 31 U/L — SIGNIFICANT CHANGE UP (ref 4–40)
AST SERPL-CCNC: 44 U/L — HIGH (ref 4–40)
BILIRUB SERPL-MCNC: 1.2 MG/DL — SIGNIFICANT CHANGE UP (ref 0.2–1.2)
BILIRUB SERPL-MCNC: 1.2 MG/DL — SIGNIFICANT CHANGE UP (ref 0.2–1.2)
BUN SERPL-MCNC: 20 MG/DL — SIGNIFICANT CHANGE UP (ref 7–23)
BUN SERPL-MCNC: 20 MG/DL — SIGNIFICANT CHANGE UP (ref 7–23)
CALCIUM SERPL-MCNC: 8.8 MG/DL — SIGNIFICANT CHANGE UP (ref 8.4–10.5)
CALCIUM SERPL-MCNC: 9 MG/DL — SIGNIFICANT CHANGE UP (ref 8.4–10.5)
CHLORIDE SERPL-SCNC: 105 MMOL/L — SIGNIFICANT CHANGE UP (ref 98–107)
CHLORIDE SERPL-SCNC: 106 MMOL/L — SIGNIFICANT CHANGE UP (ref 98–107)
CO2 SERPL-SCNC: 24 MMOL/L — SIGNIFICANT CHANGE UP (ref 22–31)
CO2 SERPL-SCNC: 26 MMOL/L — SIGNIFICANT CHANGE UP (ref 22–31)
CREAT SERPL-MCNC: 1.05 MG/DL — SIGNIFICANT CHANGE UP (ref 0.5–1.3)
CREAT SERPL-MCNC: 1.07 MG/DL — SIGNIFICANT CHANGE UP (ref 0.5–1.3)
GLUCOSE SERPL-MCNC: 119 MG/DL — HIGH (ref 70–99)
GLUCOSE SERPL-MCNC: 94 MG/DL — SIGNIFICANT CHANGE UP (ref 70–99)
HCT VFR BLD CALC: 30.5 % — LOW (ref 39–50)
HCT VFR BLD CALC: 30.8 % — LOW (ref 39–50)
HCT VFR BLD CALC: 31 % — LOW (ref 39–50)
HCT VFR BLD CALC: 31.4 % — LOW (ref 39–50)
HGB BLD-MCNC: 10.1 G/DL — LOW (ref 13–17)
HGB BLD-MCNC: 10.1 G/DL — LOW (ref 13–17)
HGB BLD-MCNC: 10.4 G/DL — LOW (ref 13–17)
HGB BLD-MCNC: 9.8 G/DL — LOW (ref 13–17)
MAGNESIUM SERPL-MCNC: 2.3 MG/DL — SIGNIFICANT CHANGE UP (ref 1.6–2.6)
MAGNESIUM SERPL-MCNC: 2.3 MG/DL — SIGNIFICANT CHANGE UP (ref 1.6–2.6)
MCHC RBC-ENTMCNC: 26.6 PG — LOW (ref 27–34)
MCHC RBC-ENTMCNC: 26.9 PG — LOW (ref 27–34)
MCHC RBC-ENTMCNC: 27.4 PG — SIGNIFICANT CHANGE UP (ref 27–34)
MCHC RBC-ENTMCNC: 27.6 PG — SIGNIFICANT CHANGE UP (ref 27–34)
MCHC RBC-ENTMCNC: 32.1 % — SIGNIFICANT CHANGE UP (ref 32–36)
MCHC RBC-ENTMCNC: 32.6 % — SIGNIFICANT CHANGE UP (ref 32–36)
MCHC RBC-ENTMCNC: 32.8 % — SIGNIFICANT CHANGE UP (ref 32–36)
MCHC RBC-ENTMCNC: 33.1 % — SIGNIFICANT CHANGE UP (ref 32–36)
MCV RBC AUTO: 82.7 FL — SIGNIFICANT CHANGE UP (ref 80–100)
MCV RBC AUTO: 82.7 FL — SIGNIFICANT CHANGE UP (ref 80–100)
MCV RBC AUTO: 83.3 FL — SIGNIFICANT CHANGE UP (ref 80–100)
MCV RBC AUTO: 83.7 FL — SIGNIFICANT CHANGE UP (ref 80–100)
NRBC # FLD: 0 K/UL — SIGNIFICANT CHANGE UP (ref 0–0)
PHOSPHATE SERPL-MCNC: 2.2 MG/DL — LOW (ref 2.5–4.5)
PHOSPHATE SERPL-MCNC: 2.2 MG/DL — LOW (ref 2.5–4.5)
PLATELET # BLD AUTO: 157 K/UL — SIGNIFICANT CHANGE UP (ref 150–400)
PLATELET # BLD AUTO: 170 K/UL — SIGNIFICANT CHANGE UP (ref 150–400)
PLATELET # BLD AUTO: 173 K/UL — SIGNIFICANT CHANGE UP (ref 150–400)
PLATELET # BLD AUTO: 180 K/UL — SIGNIFICANT CHANGE UP (ref 150–400)
PMV BLD: 8.9 FL — SIGNIFICANT CHANGE UP (ref 7–13)
PMV BLD: 9.2 FL — SIGNIFICANT CHANGE UP (ref 7–13)
PMV BLD: 9.5 FL — SIGNIFICANT CHANGE UP (ref 7–13)
PMV BLD: 9.8 FL — SIGNIFICANT CHANGE UP (ref 7–13)
POTASSIUM SERPL-MCNC: 3.5 MMOL/L — SIGNIFICANT CHANGE UP (ref 3.5–5.3)
POTASSIUM SERPL-MCNC: 3.7 MMOL/L — SIGNIFICANT CHANGE UP (ref 3.5–5.3)
POTASSIUM SERPL-SCNC: 3.5 MMOL/L — SIGNIFICANT CHANGE UP (ref 3.5–5.3)
POTASSIUM SERPL-SCNC: 3.7 MMOL/L — SIGNIFICANT CHANGE UP (ref 3.5–5.3)
PROT SERPL-MCNC: 5.9 G/DL — LOW (ref 6–8.3)
PROT SERPL-MCNC: 6.1 G/DL — SIGNIFICANT CHANGE UP (ref 6–8.3)
RBC # BLD: 3.68 M/UL — LOW (ref 4.2–5.8)
RBC # BLD: 3.69 M/UL — LOW (ref 4.2–5.8)
RBC # BLD: 3.75 M/UL — LOW (ref 4.2–5.8)
RBC # BLD: 3.77 M/UL — LOW (ref 4.2–5.8)
RBC # FLD: 15.1 % — HIGH (ref 10.3–14.5)
RBC # FLD: 15.3 % — HIGH (ref 10.3–14.5)
SODIUM SERPL-SCNC: 140 MMOL/L — SIGNIFICANT CHANGE UP (ref 135–145)
SODIUM SERPL-SCNC: 140 MMOL/L — SIGNIFICANT CHANGE UP (ref 135–145)
WBC # BLD: 6.72 K/UL — SIGNIFICANT CHANGE UP (ref 3.8–10.5)
WBC # BLD: 7.29 K/UL — SIGNIFICANT CHANGE UP (ref 3.8–10.5)
WBC # BLD: 7.42 K/UL — SIGNIFICANT CHANGE UP (ref 3.8–10.5)
WBC # BLD: 8.5 K/UL — SIGNIFICANT CHANGE UP (ref 3.8–10.5)
WBC # FLD AUTO: 6.72 K/UL — SIGNIFICANT CHANGE UP (ref 3.8–10.5)
WBC # FLD AUTO: 7.29 K/UL — SIGNIFICANT CHANGE UP (ref 3.8–10.5)
WBC # FLD AUTO: 7.42 K/UL — SIGNIFICANT CHANGE UP (ref 3.8–10.5)
WBC # FLD AUTO: 8.5 K/UL — SIGNIFICANT CHANGE UP (ref 3.8–10.5)

## 2020-07-08 PROCEDURE — 99291 CRITICAL CARE FIRST HOUR: CPT

## 2020-07-08 RX ORDER — POTASSIUM PHOSPHATE, MONOBASIC POTASSIUM PHOSPHATE, DIBASIC 236; 224 MG/ML; MG/ML
15 INJECTION, SOLUTION INTRAVENOUS ONCE
Refills: 0 | Status: COMPLETED | OUTPATIENT
Start: 2020-07-08 | End: 2020-07-08

## 2020-07-08 RX ORDER — LIDOCAINE 4 G/100G
1 CREAM TOPICAL ONCE
Refills: 0 | Status: COMPLETED | OUTPATIENT
Start: 2020-07-08 | End: 2020-07-08

## 2020-07-08 RX ORDER — SODIUM,POTASSIUM PHOSPHATES 278-250MG
1 POWDER IN PACKET (EA) ORAL
Refills: 0 | Status: COMPLETED | OUTPATIENT
Start: 2020-07-08 | End: 2020-07-09

## 2020-07-08 RX ORDER — LANOLIN ALCOHOL/MO/W.PET/CERES
6 CREAM (GRAM) TOPICAL AT BEDTIME
Refills: 0 | Status: DISCONTINUED | OUTPATIENT
Start: 2020-07-08 | End: 2020-07-10

## 2020-07-08 RX ORDER — HYDROMORPHONE HYDROCHLORIDE 2 MG/ML
0.25 INJECTION INTRAMUSCULAR; INTRAVENOUS; SUBCUTANEOUS ONCE
Refills: 0 | Status: DISCONTINUED | OUTPATIENT
Start: 2020-07-08 | End: 2020-07-08

## 2020-07-08 RX ORDER — POLYETHYLENE GLYCOL 3350 17 G/17G
17 POWDER, FOR SOLUTION ORAL
Refills: 0 | Status: DISCONTINUED | OUTPATIENT
Start: 2020-07-08 | End: 2020-07-10

## 2020-07-08 RX ORDER — ASPIRIN/CALCIUM CARB/MAGNESIUM 324 MG
81 TABLET ORAL DAILY
Refills: 0 | Status: DISCONTINUED | OUTPATIENT
Start: 2020-07-09 | End: 2020-07-10

## 2020-07-08 RX ORDER — SODIUM,POTASSIUM PHOSPHATES 278-250MG
1 POWDER IN PACKET (EA) ORAL
Refills: 0 | Status: DISCONTINUED | OUTPATIENT
Start: 2020-07-08 | End: 2020-07-08

## 2020-07-08 RX ADMIN — Medication 6 MILLIGRAM(S): at 21:04

## 2020-07-08 RX ADMIN — POTASSIUM PHOSPHATE, MONOBASIC POTASSIUM PHOSPHATE, DIBASIC 62.5 MILLIMOLE(S): 236; 224 INJECTION, SOLUTION INTRAVENOUS at 13:01

## 2020-07-08 RX ADMIN — Medication 1 TABLET(S): at 21:03

## 2020-07-08 RX ADMIN — LIDOCAINE 1 PATCH: 4 CREAM TOPICAL at 19:20

## 2020-07-08 RX ADMIN — Medication 1 TABLET(S): at 13:00

## 2020-07-08 RX ADMIN — Medication 1 TABLET(S): at 17:10

## 2020-07-08 RX ADMIN — POTASSIUM PHOSPHATE, MONOBASIC POTASSIUM PHOSPHATE, DIBASIC 62.5 MILLIMOLE(S): 236; 224 INJECTION, SOLUTION INTRAVENOUS at 05:53

## 2020-07-08 RX ADMIN — LIDOCAINE 1 PATCH: 4 CREAM TOPICAL at 21:23

## 2020-07-08 RX ADMIN — LIDOCAINE 1 PATCH: 4 CREAM TOPICAL at 15:41

## 2020-07-08 NOTE — PROGRESS NOTE ADULT - ASSESSMENT
75y Male PMH HTN, HLD, CAD with stent, afib (on Eliquis) BPH s/p TURP and large liver cysts, now with hemoperitoneum from liver cyst rupture s/p 2 units PRBC 7/6, 3 units of PRBC 1 U FFP 1U Plasma transfused, he went to IR and had a Hepatic angiogram with left hepatic and middle hepatic artery embolization.     PLAN:   Neurologic: AOx4  - Pain control with IV tylenol, Dilaudid for break through    Respiratory:   - Satting well on Room air    Cardiovascular:   - Metoprolol 50mg daily  - Atorvastatin 20mg daily  - losartan 100mg daily holding    Gastrointestinal/Nutrition: NPO  - omeprazole 40 daily  - NPO   - FAST exam positive revealing fluid in Morrisons pouch s/p embolization of  left hepatic and middle hepatic artery    Renal/Genitourinary: LR 75  - Home lasix 60 daily being held, PRN for fluid overload now  - finasteride 5mg daily  - Terazosin 5mg daily    Hematologic:   - Brilinta 90mg BID holding   - Eliquis 5 mg BID holding   - H/H trending Q6  - q 8 hour CBC    Infectious Disease:   - No issues     Endocrine:   - No issues     Disposition: SICU    Lines/Tubes: PIV

## 2020-07-08 NOTE — PHYSICAL THERAPY INITIAL EVALUATION ADULT - PERTINENT HX OF CURRENT PROBLEM, REHAB EVAL
Pt states has felt weak, while on the toilet this morning. Per EMS daughter reported pt's SBP was 70 at home and he was diaphoretic.  Upon arriving to the ED patient hbg was 8.0 and he was hypotensive. Patient received CT scan showing a ruptured multiloculated liver cyst with hemoperitoneum. Patient states that he has a long history of liver cysts and periodically has them assessed.

## 2020-07-08 NOTE — PROGRESS NOTE ADULT - ASSESSMENT
Anemia  ruptured liver cyst   Monitor hemoglobin, transfuse as needed.  off a/c    PAF  s/p DCCV  in sinus  on amio and BB  off a/c given liver cyst rupture     CAD history of stent 6 mo ago   recommend low dose asa and observe  cont statin     CHF unspecified  normal LV function on echo      Advanced care planning was discussed with patient and family.  Risks, benefits and alternatives of the cardiac treatments and medical therapy including procedures were discussed in detail and all questions were answered. Importance of compliance with medical therapy and lifestyle modification to improve cardiovascular health were addressed. Appropriate forms and patient educational materials were reviewed. 30 minutes face to face spent.

## 2020-07-08 NOTE — PROGRESS NOTE ADULT - ASSESSMENT
Patient is a 75 year old male with a PMHx of HTN, CHF, HLD, AFib (on Eliquis), BPH, liver cysts and past surgical history of cardiac stent x1, colon resection for colon carcinoma (unclear from patient history what was resected) and an umbilical hernia repair with mesh who presented with weakness, hypotension and bradycardia s/p IR hepatic angiogram w/ embolization of L and M hepatic artery recovering appropriately in the SICU.    PLAN:  - Continue monitoring CBC  - Pain control w/ dilaudid  - NPO   - LR @75cc/hr  - Care per SICU Patient is a 75 year old male with a PMHx of HTN, CHF, HLD, AFib (on Eliquis), BPH, liver cysts and past surgical history of cardiac stent x1, colon resection for colon carcinoma (unclear from patient history what was resected) and an umbilical hernia repair with mesh who presented with weakness, hypotension and bradycardia s/p IR hepatic angiogram w/ embolization of L and M hepatic artery recovering appropriately in the SICU.    PLAN:  - Continue monitoring CBC  - Pain control w/ dilaudid  - NPO   - LR @75cc/hr  - Care per SICU    Team D  [id number]

## 2020-07-08 NOTE — PROGRESS NOTE ADULT - SUBJECTIVE AND OBJECTIVE BOX
SICU Morning Progress Note       Interval/Overnight Events:     Yesterday patient had 3 U PRBC 1 FFP and 1 Plasma, he went to IR yesterday evening and is s/p hepatic angiogram with embolization of Left and middle hepatic artery.    HPI:  75M w/ PMH HTN, CHF, HLD, afib (on Eliquis) BPH and liver cysts, and past surgical history of cardiac stent x1 3 months ago, colon resection for colon carcinoma (unclear from patient history what was resected) and an umbilical hernia repair with mesh presents with weakness, hypotension, bradycardia. Pt states has felt weak, while on the toilet this morning. Per EMS daughter reported pt's SBP was 70 at home and he was diaphoretic.  Upon arriving to the ED patient hbg was 8.0 and he was hypotensive. Patient received CT scan showing a ruptured multiloculated liver cyst with hemoperitoneum. Patient admitted to SICU s/p 4 U PRBC 1 FFP 1 Plasma, s/p IR embolization of L and middle hepatic artery 7/7.    Allergies: penicillin (Swelling)    MEDICATIONS:   Neurologic Medications  HYDROmorphone  Injectable 0.5 milliGRAM(s) IV Push every 4 hours PRN Moderate Pain (4 - 6)  HYDROmorphone  Injectable 1 milliGRAM(s) IV Push every 4 hours PRN Severe Pain (7 - 10)    Respiratory Medications    Cardiovascular Medications  aMIOdarone    Tablet 200 milliGRAM(s) Oral two times a day  metoprolol tartrate 25 milliGRAM(s) Oral two times a day  tamsulosin 0.4 milliGRAM(s) Oral at bedtime    Gastrointestinal Medications  lactated ringers. 1000 milliLiter(s) IV Continuous <Continuous>  pantoprazole    Tablet 40 milliGRAM(s) Oral before breakfast    Genitourinary Medications    Hematologic/Oncologic Medications    Antimicrobial/Immunologic Medications    Endocrine/Metabolic Medications  atorvastatin 40 milliGRAM(s) Oral at bedtime  finasteride 5 milliGRAM(s) Oral daily    Topical/Other Medications    VITAL SIGNS, INS/OUTS (last 24 hours):  ((Insert SICU Vitals/Is+Os here))***  EXAM  NEUROLOGY  Exam: awake and alert, nad  RESPIRATORY  Exam: Lungs clear to auscultation, Normal expansion/effort.   CARDIOVASCULAR  Exam: S1, S2.  Regular rate and rhythm.  No Peripheral edema   GI/NUTRITION  Exam: Abdomen softly distended, nontender  Current Diet: NPO  VASCULAR  Exam: Extremities warm, pink, well-perfused.   METABOLIC/FLUIDS/ELECTROLYTES lactated ringers. 1000 milliLiter(s) IV Continuous <Continuous>  HEMATOLOGIC [x] VTE Prophylaxis:   Transfusions:	[] PRBC	[] Platelets		[] FFP	[] Cryoprecipitate  INFECTIOUS DISEASE  Antimicrobials/Immunologic Medications:    Tubes/Lines/Drains    [x] Peripheral IV  [] Central Venous Line     	[] R	[] L	[] IJ	[] Fem	[] SC	Date Placed:   [] Arterial Line		[] R	[] L	[] Fem	[] Rad	[] Ax	Date Placed:   [] PICC		[] Midline		[] Mediport  [] Urinary Catheter		Date Placed:   [x] Necessity of urinary, arterial, and venous catheters discussed  LABS  --------------------------------------------------------------------------------------  ((Insert SICU Labs here))***

## 2020-07-08 NOTE — PROGRESS NOTE ADULT - SUBJECTIVE AND OBJECTIVE BOX
Surgery Progress Note    SUBJECTIVE: Pt seen and examined at bedside. Over 24 hrs, patient received 4pRBCs, 1FFP, 1plasma. Patient is s/p hepatic angiogram w/ embolization of L and M hepatic artery (7/7). No contrast extravasation was seen.  Patient currently comfortable and in no acute distress, denying nausea, vomiting, diarrhea.     Vital Signs Last 24 Hrs  T(C): 36.8 (08 Jul 2020 08:00), Max: 37.3 (07 Jul 2020 17:00)  T(F): 98.3 (08 Jul 2020 08:00), Max: 99.1 (07 Jul 2020 17:00)  HR: 46 (08 Jul 2020 07:00) (46 - 66)  BP: 135/50 (08 Jul 2020 07:00) (103/87 - 148/61)  BP(mean): 73 (08 Jul 2020 07:00) (60 - 94)  RR: 20 (08 Jul 2020 07:00) (9 - 31)  SpO2: 92% (08 Jul 2020 07:00) (89% - 97%)    Physical Exam:  General Appearance: Seeing lying in bed, well-appearing, in no acute distress. Alert and oriented x3.  Respiratory: No labored breathing  CV: R femoral and R distal pulses palpable. IR incision site clean, dry, and intact.  Abdomen: Distended, soft, tympanic to percussion.     LABS:                        10.4   6.72  )-----------( 170      ( 08 Jul 2020 06:05 )             31.4     07-08    140  |  106  |  20  ----------------------------<  94  3.7   |  24  |  1.05    Ca    9.0      08 Jul 2020 01:05  Phos  2.2     07-08  Mg     2.3     07-08    TPro  5.9<L>  /  Alb  3.1<L>  /  TBili  1.2  /  DBili  x   /  AST  31  /  ALT  34  /  AlkPhos  60  07-08    PT/INR - ( 07 Jul 2020 00:10 )   PT: 16.8 SEC;   INR: 1.46          PTT - ( 07 Jul 2020 00:10 )  PTT:32.5 SEC      INs and OUTs:    07-07-20 @ 07:01  -  07-08-20 @ 07:00  --------------------------------------------------------  IN: 2737.5 mL / OUT: 2760 mL / NET: -22.5 mL    07-08-20 @ 07:01  -  07-08-20 @ 08:06  --------------------------------------------------------  IN: 75 mL / OUT: 60 mL / NET: 15 mL

## 2020-07-08 NOTE — PROGRESS NOTE ADULT - SUBJECTIVE AND OBJECTIVE BOX
Subjective: Patient seen and examined. No new events except as noted.     SUBJECTIVE/ROS:  No chest pain, dyspnea, palpitation, or dizziness.       MEDICATIONS:  MEDICATIONS  (STANDING):  aMIOdarone    Tablet 200 milliGRAM(s) Oral two times a day  atorvastatin 40 milliGRAM(s) Oral at bedtime  finasteride 5 milliGRAM(s) Oral daily  lactated ringers. 1000 milliLiter(s) (75 mL/Hr) IV Continuous <Continuous>  metoprolol tartrate 25 milliGRAM(s) Oral two times a day  pantoprazole    Tablet 40 milliGRAM(s) Oral before breakfast  tamsulosin 0.4 milliGRAM(s) Oral at bedtime      PHYSICAL EXAM:  T(C): 36.8 (07-08-20 @ 08:00), Max: 37.3 (07-07-20 @ 17:00)  HR: 48 (07-08-20 @ 08:00) (46 - 66)  BP: 137/52 (07-08-20 @ 08:00) (103/87 - 148/61)  RR: 26 (07-08-20 @ 08:00) (9 - 31)  SpO2: 93% (07-08-20 @ 08:00) (89% - 97%)  Wt(kg): --  I&O's Summary    07 Jul 2020 07:01  -  08 Jul 2020 07:00  --------------------------------------------------------  IN: 2737.5 mL / OUT: 2760 mL / NET: -22.5 mL    08 Jul 2020 07:01  -  08 Jul 2020 08:24  --------------------------------------------------------  IN: 75 mL / OUT: 60 mL / NET: 15 mL        Weight (kg): 98.9 (07-07 @ 20:20)      JVP: Normal  Neck: supple  Lung: clear   CV: S1 S2 , Murmur:  Abd: soft  Ext: No edema  neuro: Awake / alert  Psych: flat affect  Skin: normal``    LABS/DATA:    CARDIAC MARKERS:                                10.4   6.72  )-----------( 170      ( 08 Jul 2020 06:05 )             31.4     07-08    140  |  106  |  20  ----------------------------<  94  3.7   |  24  |  1.05    Ca    9.0      08 Jul 2020 01:05  Phos  2.2     07-08  Mg     2.3     07-08    TPro  5.9<L>  /  Alb  3.1<L>  /  TBili  1.2  /  DBili  x   /  AST  31  /  ALT  34  /  AlkPhos  60  07-08    proBNP:   Lipid Profile:   HgA1c:   TSH:     TELE:  EKG:        < from: Transthoracic Echocardiogram (07.07.20 @ 16:07) >  CONCLUSIONS:  1. Mitral annular calcification, otherwise normal mitral  valve. Mild mitral regurgitation.  2. Calcified trileaflet aortic valve with normal opening.  3. Mildly dilated left atrium.  LA volume index = 35 cc/m2.  4. Normal left ventricular internal dimensions and wall  thicknesses.  5. Normal left ventricular systolic function. No segmental  wall motion abnormalities.  6. Normal right ventricular size and function.  ------------------------------------------------------------------------  Confirmed on  7/7/2020 - 17:02:56 by Isadora Sumner,    < end of copied text >

## 2020-07-08 NOTE — PROGRESS NOTE ADULT - SUBJECTIVE AND OBJECTIVE BOX
POST ANESTHESIA EVALUATION    75y Male POSTOP DAY 1 S/P hepatic embolization    MENTAL STATUS: Patient participation [ x ] Awake     [  ] Arousable     [  ] Sedated    AIRWAY PATENCY: [x  ] Satisfactory  [  ] Other:     Vital Signs Last 24 Hrs  T(C): 36.8 (08 Jul 2020 08:00), Max: 37.3 (07 Jul 2020 17:00)  T(F): 98.3 (08 Jul 2020 08:00), Max: 99.1 (07 Jul 2020 17:00)  HR: 52 (08 Jul 2020 09:00) (46 - 66)  BP: 148/61 (08 Jul 2020 09:00) (103/87 - 148/61)  BP(mean): 85 (08 Jul 2020 09:00) (67 - 94)  RR: 19 (08 Jul 2020 09:00) (9 - 31)  SpO2: 93% (08 Jul 2020 09:00) (89% - 97%)  I&O's Summary    07 Jul 2020 07:01  -  08 Jul 2020 07:00  --------------------------------------------------------  IN: 2737.5 mL / OUT: 2760 mL / NET: -22.5 mL    08 Jul 2020 07:01  -  08 Jul 2020 10:10  --------------------------------------------------------  IN: 75 mL / OUT: 120 mL / NET: -45 mL          NAUSEA/ VOMITTING:  [x  ] NONE  [  ] CONTROLLED [  ] OTHER     PAIN: [ x ] CONTROLLED WITH CURRENT REGIMEN  [  ] OTHER    [ x ] NO APPARENT ANESTHESIA COMPLICATIONS      Comments:

## 2020-07-08 NOTE — CHART NOTE - NSCHARTNOTEFT_GEN_A_CORE
D Team Surgery Post-Op Note P [id number]    SUBJECTIVE: pain controlled, no nausea/vomiting/headache/dizziness/chest pain/shortness of breath    OBJECTIVE:  PHYSICAL EXAM:  GA: NAD  Abdomen:  -  soft, distended, appropriate incisional tenderness  - Incision: clean/dry/intact     Vitals/Labs:  Vital Signs Last 24 Hrs  T(C): 36 (07 Jul 2020 20:20), Max: 37.3 (07 Jul 2020 17:00)  T(F): 96.8 (07 Jul 2020 20:20), Max: 99.1 (07 Jul 2020 17:00)  HR: 59 (07 Jul 2020 22:00) (47 - 61)  BP: 148/61 (07 Jul 2020 22:00) (103/87 - 148/61)  BP(mean): 83 (07 Jul 2020 22:00) (60 - 94)  RR: 27 (07 Jul 2020 22:00) (9 - 30)  SpO2: 91% (07 Jul 2020 22:00) (91% - 98%)    I&O's Detail    06 Jul 2020 07:01  -  07 Jul 2020 07:00  --------------------------------------------------------  IN:    IV PiggyBack: 50 mL    lactated ringers.: 975 mL    Packed Red Blood Cells: 620 mL  Total IN: 1645 mL    OUT:    Voided: 925 mL  Total OUT: 925 mL    Total NET: 720 mL      07 Jul 2020 07:01  -  08 Jul 2020 00:59  --------------------------------------------------------  IN:    IV PiggyBack: 100 mL    lactated ringers.: 532.5 mL    Packed Red Blood Cells: 600 mL    Plasma: 300 mL    Platelets - Single Donor: 205 mL  Total IN: 1737.5 mL    OUT:    Indwelling Catheter - Urethral: 780 mL    Voided: 975 mL  Total OUT: 1755 mL    Total NET: -17.5 mL                          8.7    7.70  )-----------( 156      ( 07 Jul 2020 14:25 )             26.3       07-07    141  |  106  |  32<H>  ----------------------------<  125<H>  3.8   |  22  |  1.46<H>    Ca    8.6      07 Jul 2020 00:10  Phos  3.3     07-07  Mg     2.2     07-07    TPro  5.8<L>  /  Alb  3.2<L>  /  TBili  1.0  /  DBili  x   /  AST  19  /  ALT  27  /  AlkPhos  55  07-07      CAPILLARY BLOOD GLUCOSE    LIVER FUNCTIONS - ( 07 Jul 2020 00:10 )  Alb: 3.2 g/dL / Pro: 5.8 g/dL / ALK PHOS: 55 u/L / ALT: 27 u/L / AST: 19 u/L / GGT: x             PT/INR - ( 07 Jul 2020 00:10 )   PT: 16.8 SEC;   INR: 1.46          PTT - ( 07 Jul 2020 00:10 )  PTT:32.5 SEC    MEDICATIONS  (STANDING):  aMIOdarone    Tablet 200 milliGRAM(s) Oral two times a day  atorvastatin 40 milliGRAM(s) Oral at bedtime  finasteride 5 milliGRAM(s) Oral daily  lactated ringers. 1000 milliLiter(s) (75 mL/Hr) IV Continuous <Continuous>  metoprolol tartrate 25 milliGRAM(s) Oral two times a day  pantoprazole    Tablet 40 milliGRAM(s) Oral before breakfast  tamsulosin 0.4 milliGRAM(s) Oral at bedtime    MEDICATIONS  (PRN):  HYDROmorphone  Injectable 0.5 milliGRAM(s) IV Push every 4 hours PRN Moderate Pain (4 - 6)  HYDROmorphone  Injectable 1 milliGRAM(s) IV Push every 4 hours PRN Severe Pain (7 - 10)      ASSESSMENT/PLAN: JIGAR KHANNA 75y Male s/p hepatic angiogram with left hepatic and middle hepatic artery embolization  - Diet: NPO  - Pain control   - Input and outputs   - DVT ppx  - OOB/incentive spirometry     D Team Surgery Post-Op Note [id number]  Sandy Sanon, PGY-1

## 2020-07-08 NOTE — PHYSICAL THERAPY INITIAL EVALUATION ADULT - DIAGNOSIS, PT EVAL
Pt presents with decreased functional mobility due to Pt presents with decreased functional mobility due to Hemoperitoneum

## 2020-07-09 ENCOUNTER — TRANSCRIPTION ENCOUNTER (OUTPATIENT)
Age: 75
End: 2020-07-09

## 2020-07-09 LAB
ALBUMIN SERPL ELPH-MCNC: 3.1 G/DL — LOW (ref 3.3–5)
ALP SERPL-CCNC: 59 U/L — SIGNIFICANT CHANGE UP (ref 40–120)
ALT FLD-CCNC: 48 U/L — HIGH (ref 4–41)
ANION GAP SERPL CALC-SCNC: 10 MMO/L — SIGNIFICANT CHANGE UP (ref 7–14)
APTT BLD: 30.5 SEC — SIGNIFICANT CHANGE UP (ref 27–36.3)
AST SERPL-CCNC: 42 U/L — HIGH (ref 4–40)
BILIRUB DIRECT SERPL-MCNC: 0.4 MG/DL — HIGH (ref 0.1–0.2)
BILIRUB SERPL-MCNC: 1.4 MG/DL — HIGH (ref 0.2–1.2)
BUN SERPL-MCNC: 17 MG/DL — SIGNIFICANT CHANGE UP (ref 7–23)
CALCIUM SERPL-MCNC: 8.8 MG/DL — SIGNIFICANT CHANGE UP (ref 8.4–10.5)
CHLORIDE SERPL-SCNC: 103 MMOL/L — SIGNIFICANT CHANGE UP (ref 98–107)
CO2 SERPL-SCNC: 25 MMOL/L — SIGNIFICANT CHANGE UP (ref 22–31)
CREAT SERPL-MCNC: 0.96 MG/DL — SIGNIFICANT CHANGE UP (ref 0.5–1.3)
GLUCOSE SERPL-MCNC: 130 MG/DL — HIGH (ref 70–99)
HCT VFR BLD CALC: 31.4 % — LOW (ref 39–50)
HGB BLD-MCNC: 10.4 G/DL — LOW (ref 13–17)
INR BLD: 1.23 — HIGH (ref 0.88–1.17)
MAGNESIUM SERPL-MCNC: 2.3 MG/DL — SIGNIFICANT CHANGE UP (ref 1.6–2.6)
MCHC RBC-ENTMCNC: 27.3 PG — SIGNIFICANT CHANGE UP (ref 27–34)
MCHC RBC-ENTMCNC: 33.1 % — SIGNIFICANT CHANGE UP (ref 32–36)
MCV RBC AUTO: 82.4 FL — SIGNIFICANT CHANGE UP (ref 80–100)
NRBC # FLD: 0 K/UL — SIGNIFICANT CHANGE UP (ref 0–0)
PHOSPHATE SERPL-MCNC: 2.5 MG/DL — SIGNIFICANT CHANGE UP (ref 2.5–4.5)
PLATELET # BLD AUTO: 175 K/UL — SIGNIFICANT CHANGE UP (ref 150–400)
PMV BLD: 8.9 FL — SIGNIFICANT CHANGE UP (ref 7–13)
POTASSIUM SERPL-MCNC: 3.5 MMOL/L — SIGNIFICANT CHANGE UP (ref 3.5–5.3)
POTASSIUM SERPL-SCNC: 3.5 MMOL/L — SIGNIFICANT CHANGE UP (ref 3.5–5.3)
PROT SERPL-MCNC: 5.9 G/DL — LOW (ref 6–8.3)
PROTHROM AB SERPL-ACNC: 14.2 SEC — HIGH (ref 9.8–13.1)
RBC # BLD: 3.81 M/UL — LOW (ref 4.2–5.8)
RBC # FLD: 15.4 % — HIGH (ref 10.3–14.5)
SODIUM SERPL-SCNC: 138 MMOL/L — SIGNIFICANT CHANGE UP (ref 135–145)
TSH SERPL-MCNC: 22.23 UIU/ML — HIGH (ref 0.27–4.2)
WBC # BLD: 7.79 K/UL — SIGNIFICANT CHANGE UP (ref 3.8–10.5)
WBC # FLD AUTO: 7.79 K/UL — SIGNIFICANT CHANGE UP (ref 3.8–10.5)

## 2020-07-09 PROCEDURE — 99232 SBSQ HOSP IP/OBS MODERATE 35: CPT

## 2020-07-09 RX ORDER — LOSARTAN POTASSIUM 100 MG/1
100 TABLET, FILM COATED ORAL DAILY
Refills: 0 | Status: DISCONTINUED | OUTPATIENT
Start: 2020-07-09 | End: 2020-07-10

## 2020-07-09 RX ORDER — FUROSEMIDE 40 MG
60 TABLET ORAL DAILY
Refills: 0 | Status: DISCONTINUED | OUTPATIENT
Start: 2020-07-09 | End: 2020-07-09

## 2020-07-09 RX ORDER — HEPARIN SODIUM 5000 [USP'U]/ML
5000 INJECTION INTRAVENOUS; SUBCUTANEOUS EVERY 8 HOURS
Refills: 0 | Status: DISCONTINUED | OUTPATIENT
Start: 2020-07-09 | End: 2020-07-10

## 2020-07-09 RX ORDER — AMIODARONE HYDROCHLORIDE 400 MG/1
100 TABLET ORAL DAILY
Refills: 0 | Status: DISCONTINUED | OUTPATIENT
Start: 2020-07-09 | End: 2020-07-10

## 2020-07-09 RX ORDER — LOSARTAN POTASSIUM 100 MG/1
100 TABLET, FILM COATED ORAL DAILY
Refills: 0 | Status: DISCONTINUED | OUTPATIENT
Start: 2020-07-09 | End: 2020-07-09

## 2020-07-09 RX ORDER — FUROSEMIDE 40 MG
60 TABLET ORAL DAILY
Refills: 0 | Status: DISCONTINUED | OUTPATIENT
Start: 2020-07-09 | End: 2020-07-10

## 2020-07-09 RX ADMIN — Medication 60 MILLIGRAM(S): at 12:09

## 2020-07-09 RX ADMIN — HEPARIN SODIUM 5000 UNIT(S): 5000 INJECTION INTRAVENOUS; SUBCUTANEOUS at 13:19

## 2020-07-09 RX ADMIN — LIDOCAINE 1 PATCH: 4 CREAM TOPICAL at 09:24

## 2020-07-09 RX ADMIN — HEPARIN SODIUM 5000 UNIT(S): 5000 INJECTION INTRAVENOUS; SUBCUTANEOUS at 21:38

## 2020-07-09 RX ADMIN — LIDOCAINE 1 PATCH: 4 CREAM TOPICAL at 03:00

## 2020-07-09 RX ADMIN — Medication 81 MILLIGRAM(S): at 12:08

## 2020-07-09 RX ADMIN — LIDOCAINE 1 PATCH: 4 CREAM TOPICAL at 07:39

## 2020-07-09 RX ADMIN — AMIODARONE HYDROCHLORIDE 100 MILLIGRAM(S): 400 TABLET ORAL at 12:08

## 2020-07-09 RX ADMIN — Medication 6 MILLIGRAM(S): at 21:37

## 2020-07-09 RX ADMIN — Medication 1 TABLET(S): at 07:40

## 2020-07-09 RX ADMIN — LOSARTAN POTASSIUM 100 MILLIGRAM(S): 100 TABLET, FILM COATED ORAL at 12:09

## 2020-07-09 NOTE — PROGRESS NOTE ADULT - ATTENDING COMMENTS
Agree with notes of health care providers on my service (PAs, Residents and House Staff).  The patient is in SICU with diagnosis mentioned in the note.    The patient is a critical care patient with life threatening hemodynamic and metabolic instability in SICU.  Risk benefit analyzes discussed.  The documentation of the total time spent 55-75 minutes ( 0800Hrs-0920Hrs in AM ).  75y Male PMH HTN, HLD, CAD with stent, afib  with hemoperitoneum from liver cyst rupture s/p IR  Hepatic angiogram with left hepatic and middle hepatic artery embolization.   I have personally examined the patient, reviewed data and laboratory tests/x-rays and all pertinent electronic images.  EXAM  NEUROLOGY  Exam: awake and alert, nad  RESPIRATORY  Exam: Lungs clear to auscultation, Normal expansion/effort.   CARDIOVASCULAR  Exam: S1, S2.  Regular rate and rhythm.  No Peripheral edema   GI/NUTRITION  Exam: Abdomen softly distended, nontender  Current Diet: NPO  VASCULAR  Exam: Extremities warm,   The assessment and plan is specified below:  PLAN:   Neurologic: AOx4  - Pain control with IV tylenol, Dilaudid for break through    Respiratory:   - Satting well on Room air    Cardiovascular:   - Metoprolol 50mg daily  - Atorvastatin 20mg daily  - losartan 100mg daily holding    Gastrointestinal/Nutrition: NPO  - omeprazole 40 daily  - NPO   - FAST exam positive revealing fluid in Morrisons pouch s/p embolization of  left hepatic and middle hepatic artery    Renal/Genitourinary: LR 75  - Home lasix 60 daily being held, PRN for fluid overload now  - finasteride 5mg daily  - Terazosin 5mg daily    Hematologic:   - Brilinta 90mg BID holding   - Eliquis 5 mg BID holding   - H/H trending Q6  - q 8 hour CBC    Infectious Disease:   - No issues     Endocrine:   - No issues     Disposition: SICU  CCDX. hemorrhagic shock, acute blood loss anemia  Lines/Tubes: PIV
Agree with notes of health care providers on my service (PAs, Residents and House Staff).  The patient is in SICU with diagnosis mentioned in the note.    The SICU team has a constant risk benefit analyzes discussion with the primary team, all consultants, House Staff and PA's.  75y Male PMH HTN, HLD, CAD with stent, afib (on Eliquis) BPH s/p TURP and large liver cysts, now with hemoperitoneum from     liver cyst rupture s/p 2 units PRBC 7/6, 3 units of PRBC 1 U FFP 1U Plasma transfused, he went to IR and had a Hepatic     angiogram with left hepatic and middle hepatic artery embolization.   I have personally examined the patient, reviewed data and laboratory tests/x-rays and all pertinent electronic images.    EXAM  NEUROLOGY  RASS:   0  	GCS: 15    Exam: Normal, NAD, alert, oriented x3, no focal deficits.     HEENT  Exam: Normocephalic, atraumatic, EOMI.      RESPIRATORY  Exam: Lungs clear to auscultation, Normal expansion/effort.       CARDIOVASCULAR  Exam: S1, S2.  Regular rate and rhythm.  NO Peripheral edema     GI/NUTRITION  Exam: Abdomen soft, Non-tender, Non-distended.      Current Diet:  NPO    VASCULAR  Exam: Extremities warm,    The assessment and plan is specified below:  PLAN:   Neurologic: AOx4  - Pain control with IV tylenol, Dilaudid for break through    Respiratory:   - Satting well on Room air    Cardiovascular:   - Resume home lasix today  - Discontinue lopressor, change amiodarone to 100mg daily given bradycardia  - Continue Atorvastatin 20mg daily  - Resume home losartan 100 mg    Gastrointestinal/Nutrition:   - omeprazole 40 daily  - Regular diet  - FAST exam positive revealing fluid in Morrisons pouch s/p embolization of  left hepatic and middle hepatic artery    Renal/Genitourinary: LR 75  - Resume home lasix 60 daily  - finasteride 5mg daily  - Terazosin 5mg daily    Hematologic:   - ASA 81mg daily for cardiac stents (Placed in December), per Cardiology no need to resume Brillinta given 7 mos since stent placement, and Brillinta not used with Eliquis  - Eliquis 5 mg BID home med for AFIB, continue to hold   - H/H stable, daily CBC  - Resume DVT ppx    Infectious Disease:   - No issues     Endocrine:   - No issues     Disposition: Telemetry floor
Agree with notes of health care providers on my service (PAs, Residents and House Staff).  The patient is in SICU with diagnosis mentioned in the note.    The patient is a critical care patient with life threatening hemodynamic and metabolic instability in SICU.  Risk benefit analyzes discussed.  The documentation of the total time spent 55-75 minutes ( 0800Hrs-0920Hrs in AM ).  75y Male PMH HTN, HLD, CAD with stent, afib (on Eliquis) BPH s/p TURP and large liver cysts, now with hemoperitoneum from liver cyst rupture s/p 4 total units   I have personally examined the patient, reviewed data and laboratory tests/x-rays and all pertinent electronic images.  EXAM  NEUROLOGY  Exam: Awake and alert NAD  RESPIRATORY  Exam: Lungs clear to auscultation, Normal expansion/effort.   CARDIOVASCULAR  Exam: S1, S2.  Regular rate and rhythm.    GI/NUTRITION  Exam: Abdomen obese, softly distended. FAST + Right and Left UQ  VASCULAR  Exam: Extremities warm    The assessment and plan is specified below:  PLAN:   Neurologic: AOx4  - Pain control with IV tylenol, dilaudid for break through    Respiratory:   - Satting well on Room air    Cardiovascular:   - Metoprolol 50mg daily  - Atorvastatin 20mg daily  - losartan 100mg daily holding    Gastrointestinal/Nutrition: NPO  - omeprazole 40 daily  - NPO   - FAST exam positive revealing fluid in Morrisons pouch    Renal/Genitourinary: LR 75  - Home lasix 60 daily being held, PRN for fluid overload now  - finasteride 5mg daily  - Terazosin 5mg daily    Hematologic:   - Brilinta 90mg BID holding   - Eliquis 5 mg BID holding   - H/H trending Q6  - 4U PRBCs given with transient response between each one     Infectious Disease:   - No issues     Endocrine:   - No issues     Disposition: SICU  CCDX. hemorraghic shock, hypotension, acute blood loss anemia

## 2020-07-09 NOTE — PROGRESS NOTE ADULT - SUBJECTIVE AND OBJECTIVE BOX
SICU Daily Progress Note  =====================================================  Interval/Overnight Events:           No acute events  overnight . H/H stable 10/31 . Pt was OOB to chair . Lidocaine patch given for back pain       HPI:  75M w/ PMH HTN, CHF, HLD, afib (on Eliquis) BPH and liver cysts, and past surgical history of cardiac stent x1 3     months ago, colon resection for colon carcinoma (unclear from patient history what was resected) and an umbilical hernia     repair with mesh presents with weakness, hypotension, bradycardia. Pt states has felt weak, while on the toilet this     morning. Per EMS daughter reported pt's SBP was 70 at home and he was diaphoretic.  Upon arriving to the ED patient hbg     was 8.0 and he was hypotensive. Patient received CT scan showing a ruptured multiloculated liver cyst with hemoperitoneum.     Patient admitted to SICU s/p 4 U PRBC 1 FFP 1 Plasma, s/p IR embolization of L and middle hepatic artery 7/7.    Allergies: penicillin (Swelling)    MEDICATIONS:   --------------------------------------------------------------------------------------  Neurologic Medications  HYDROmorphone  Injectable 0.5 milliGRAM(s) IV Push every 4 hours PRN Moderate Pain (4 - 6)  HYDROmorphone  Injectable 1 milliGRAM(s) IV Push every 4 hours PRN Severe Pain (7 - 10)  melatonin 6 milliGRAM(s) Oral at bedtime    Respiratory Medications    Cardiovascular Medications  aMIOdarone    Tablet 200 milliGRAM(s) Oral two times a day  metoprolol tartrate 25 milliGRAM(s) Oral two times a day  tamsulosin 0.4 milliGRAM(s) Oral at bedtime    Gastrointestinal Medications  pantoprazole    Tablet 40 milliGRAM(s) Oral before breakfast  polyethylene glycol 3350 17 Gram(s) Oral two times a day PRN for constipation when out of bed to chair  potassium phosphate / sodium phosphate Tablet (K-PHOS No. 2) 1 Tablet(s) Oral four times a day with meals    Genitourinary Medications    Hematologic/Oncologic Medications  aspirin  chewable 81 milliGRAM(s) Oral daily    Antimicrobial/Immunologic Medications    Endocrine/Metabolic Medications  atorvastatin 40 milliGRAM(s) Oral at bedtime  finasteride 5 milliGRAM(s) Oral daily    Topical/Other Medications    --------------------------------------------------------------------------------------    VITAL SIGNS, INS/OUTS (last 24 hours):  --------------------------------------------------------------------------------------  T(C): 36.3 (07-09-20 @ 00:00), Max: 36.8 (07-08-20 @ 08:00)  HR: 46 (07-09-20 @ 01:00) (45 - 66)  BP: 165/56 (07-09-20 @ 01:00) (120/76 - 165/56)  BP(mean): 85 (07-09-20 @ 01:00) (67 - 99)  ABP: --  ABP(mean): --  RR: 23 (07-09-20 @ 01:00) (11 - 27)  SpO2: 93% (07-09-20 @ 01:00) (92% - 99%)  Wt(kg): --  CVP(mm Hg): --  CI: --  CAPILLARY BLOOD GLUCOSE       N/A      07-07 @ 07:01  -  07-08 @ 07:00  --------------------------------------------------------  IN:    IV PiggyBack: 350 mL    lactated ringers.: 1282.5 mL    Packed Red Blood Cells: 600 mL    Plasma: 300 mL    Platelets - Single Donor: 205 mL  Total IN: 2737.5 mL    OUT:    Indwelling Catheter - Urethral: 1785 mL    Voided: 975 mL  Total OUT: 2760 mL    Total NET: -22.5 mL      07-08 @ 07:01  -  07-09 @ 01:59  --------------------------------------------------------  IN:    IV PiggyBack: 250 mL    lactated ringers.: 235 mL    Oral Fluid: 1000 mL  Total IN: 1485 mL    OUT:    Indwelling Catheter - Urethral: 775 mL  Total OUT: 775 mL    Total NET: 710 mL        --------------------------------------------------------------------------------------      EXAM  NEUROLOGY  RASS:   0  	GCS: 15    Exam: Normal, NAD, alert, oriented x3, no focal deficits.     HEENT  Exam: Normocephalic, atraumatic, EOMI.      RESPIRATORY  Exam: Lungs clear to auscultation, Normal expansion/effort.       CARDIOVASCULAR  Exam: S1, S2.  Regular rate and rhythm.  NO Peripheral edema     GI/NUTRITION  Exam: Abdomen soft, Non-tender, Non-distended.      Current Diet:  NPO    VASCULAR  Exam: Extremities warm, pink, well-perfused.     MUSCULOSKELETAL  Exam: All extremities moving spontaneously without limitations.    SKIN  Exam: Good skin turgor, no skin breakdown.       METABOLIC/FLUIDS/ELECTROLYTES  potassium phosphate / sodium phosphate Tablet (K-PHOS No. 2) 1 Tablet(s) Oral four times a day with meals      HEMATOLOGIC  [x] VTE Prophylaxis: aspirin  chewable 81 milliGRAM(s) Oral daily    Transfusions:	[] PRBC	[] Platelets		[] FFP	[] Cryoprecipitate    INFECTIOUS DISEASE  Antimicrobials/Immunologic Medications:    Day #      of     ***    Tubes/Lines/Drains  ***  [x] Peripheral IV  [] Central Venous Line     	[] R	[] L	[] IJ	[] Fem	[] SC	Date Placed:   [] Arterial Line		[] R	[] L	[] Fem	[] Rad	[] Ax	Date Placed:   [] PICC		[] Midline		[] Mediport  [] Urinary Catheter		Date Placed:   [x] Necessity of urinary, arterial, and venous catheters discussed    LABS  --------------------------------------------------------------------------------------    --------------------------------------------------------------------------------------    OTHER LABORATORY:     IMAGING STUDIES:   CXR:   oses:

## 2020-07-09 NOTE — DISCHARGE NOTE PROVIDER - NSDCFUADDINST_GEN_ALL_CORE_FT
WOUND CARE: Please keep incisions clean and dry.  Please do not scrub or rub incisions.  Do not use lotion or powder on incisions.   BATHING: Please do not submerge wound underwater.  You may shower and / or sponge bathe.  ACTIVITY: No heavy lifting or straining. Otherwise, you may return to your usual level of physical activity.  If you are taking narcotic pain medication (such as Oxycodone or Percocet) DO NOT drive a car, operate machinery or make important decisions.  DIET: Return to your usual diet as tolerated.  NOTIFY YOUR SURGEON IF: You have any bleeding that does not stop, any pus draining from your wound(s), any fever (over 100.4 F) or chills, persistent nausea / vomiting, persistent diarrhea or if your pain is not controlled on your discharge pain medications.  FOLLOW-UP: Please follow up with your primary care physician in one week regarding your hospitalization.  Please follow-up with your surgeon, Dr. Daniels within 7 days following discharge.  Please call [phone] to schedule an appointment.  Please follow-up with interventional radiologist, Dr. Lim within 7 days following discharge.  Please call [phone] to schedule an appointment. WOUND CARE: Please keep incisions clean and dry.  Please do not scrub or rub incisions.  Do not use lotion or powder on incisions.   BATHING: Please do not submerge wound underwater.  You may shower and / or sponge bathe.  ACTIVITY: No heavy lifting or straining. Otherwise, you may return to your usual level of physical activity.  If you are taking narcotic pain medication (such as Oxycodone or Percocet) DO NOT drive a car, operate machinery or make important decisions.  DIET: Return to your usual diet as tolerated.  NOTIFY YOUR SURGEON IF: You have any bleeding that does not stop, any pus draining from your wound(s), any fever (over 100.4 F) or chills, persistent nausea / vomiting, persistent diarrhea or if your pain is not controlled on your discharge pain medications.  FOLLOW-UP: Please follow up with your primary care physician in one week regarding your hospitalization.  Please follow-up with your surgeon, Dr. Daniels within 7 days following discharge.  Please call [phone] to schedule an appointment.  Please follow-up with interventional radiologist, Dr. Lim within 7 days following discharge.  Please call [phone] to schedule an appointment.  Please follow-up with cardiologist, Dr. Contreras within 7 days following discharge.  Please call [phone] to schedule an appointment.

## 2020-07-09 NOTE — PROGRESS NOTE ADULT - SUBJECTIVE AND OBJECTIVE BOX
Surgery Progress Note    SUBJECTIVE: Pt seen and examined at bedside in SICU. No acute events overnight. Patient comfortable and in no-apparent distress. Hgb and blood pressure stable. No nausea, vomiting, diarrhea. Pain is controlled. +Gas/+BM. Tolerating diet.    Vital Signs Last 24 Hrs  T(C): 36.2 (09 Jul 2020 04:00), Max: 36.8 (08 Jul 2020 08:00)  T(F): 97.2 (09 Jul 2020 04:00), Max: 98.3 (08 Jul 2020 08:00)  HR: 47 (09 Jul 2020 06:00) (40 - 66)  BP: 166/65 (09 Jul 2020 06:00) (120/76 - 166/65)  BP(mean): 91 (09 Jul 2020 06:00) (68 - 99)  RR: 6 (09 Jul 2020 06:00) (6 - 27)  SpO2: 96% (09 Jul 2020 06:00) (93% - 99%)    Physical Exam:  General Appearance: Appears well, NAD  Respiratory: breathing comfortably on room air  CV: Pulse regularly present  Abdomen: Nontender, distended, dull on percussion    LABS:                        10.4   7.79  )-----------( 175      ( 09 Jul 2020 06:00 )             31.4     07-09    138  |  103  |  17  ----------------------------<  130<H>  3.5   |  25  |  0.96    Ca    8.8      09 Jul 2020 06:00  Phos  2.5     07-09  Mg     2.3     07-09    TPro  5.9<L>  /  Alb  3.1<L>  /  TBili  1.4<H>  /  DBili  0.4<H>  /  AST  42<H>  /  ALT  48<H>  /  AlkPhos  59  07-09    PT/INR - ( 09 Jul 2020 06:00 )   PT: 14.2 SEC;   INR: 1.23          PTT - ( 09 Jul 2020 06:00 )  PTT:30.5 SEC      INs and OUTs:    07-08-20 @ 07:01  -  07-09-20 @ 07:00  --------------------------------------------------------  IN: 1485 mL / OUT: 1085 mL / NET: 400 mL

## 2020-07-09 NOTE — DISCHARGE NOTE PROVIDER - HOSPITAL COURSE
Patient is a 75 year old male with a PMHx of HTN, CHF, HLD, AFib (on Eliquis), BPH, liver cysts and past surgical history of cardiac stent x1, colon resection for colon carcinoma (unclear from patient history what was resected) and an umbilical hernia repair with mesh who presented with weakness, hypotension and bradycardia.  Patient states that he felt weak while on the toilet this morning.  Per EMS, daughter reported patient's SBP was 70 at home and he was diaphoretic.          Upon arriving to the ED, patient hemoglobin was 8.0 and he was hypotensive.  CT scan performed that showed a ruptured multiloculated liver cyst with hemoperitoneum.  Patient states that he has a long history of liver cysts and periodically has them assessed.  He was complaining of abdominal and back pain.        On 7/6 patient was transferred to SICU for hemodynamic monitoring.  He had serial CBCs performed and required 5 units of PRBCs, 1 unit of FFP and 1 unit of platelets.  Patient remained hemodynamically stable.  He was kept NPO with IV fluids.        On 7/7 patient went to IR for embolization of left hepatic artery and middle hepatic artery using gelfoam.  He remained NPO with IV fluids.        On 7/8 patient was advanced to a regular diet, which he tolerated well.  He remained hemodynamically stable and CBC remained stable.        At the time of discharge, the patient was hemodynamically stable, was tolerating PO diet, was voiding urine and passing stool.  He was ambulating and was comfortable with adequate pain control.  The patient was instructed to follow up with Dr. Daniels within 1 week after discharge from the hospital.  The patient / family felt comfortable with discharge.  The patient had no other issues.         Per attending, patient deemed medically stable and cleared for discharge. Patient is a 75 year old male with a PMHx of HTN, CHF, HLD, AFib (on Eliquis), BPH, liver cysts and past surgical history of cardiac stent x1, colon resection for colon carcinoma (unclear from patient history what was resected) and an umbilical hernia repair with mesh who presented with weakness, hypotension and bradycardia.  Patient states that he felt weak while on the toilet this morning.  Per EMS, daughter reported patient's SBP was 70 at home and he was diaphoretic.          Upon arriving to the ED, patient hemoglobin was 8.0 and he was hypotensive.  CT scan performed that showed a ruptured multiloculated liver cyst with hemoperitoneum.  Patient states that he has a long history of liver cysts and periodically has them assessed.  He was complaining of abdominal and back pain.        On 7/6 patient was transferred to SICU for hemodynamic monitoring.  He had serial CBCs performed and required 5 units of PRBCs, 1 unit of FFP and 1 unit of platelets.  Patient remained hemodynamically stable.  He was kept NPO with IV fluids.        On 7/7 patient went to IR for embolization of left hepatic artery and middle hepatic artery using gelfoam.  He remained NPO with IV fluids.        On 7/8 patient was advanced to a regular diet, which he tolerated well.  He remained hemodynamically stable and CBC remained stable. Seen by PT, no needs.        On 7/9 patient was continued a regular diet, which he tolerated well.  He remained hemodynamically stable and CBC remained stable. Patient bradycardiac during admission, home Metoprolol held. TSH elevated (22), T4 low (4.8). Instructions to follow up with Endocrinology provided in discharge instructions.        At the time of discharge, the patient was hemodynamically stable, was tolerating PO diet, was voiding urine and passing stool.  He was ambulating and was comfortable with adequate pain control.  The patient was instructed to follow up with Dr. Daniels within 1 week after discharge from the hospital.  The patient / family felt comfortable with discharge.  The patient had no other issues.         Per attending, patient deemed medically stable and cleared for discharge. Patient is a 75 year old male with a PMHx of HTN, CHF, HLD, AFib (on Eliquis), BPH, liver cysts and past surgical history of cardiac stent x1, colon resection for colon carcinoma (unclear from patient history what was resected) and an umbilical hernia repair with mesh who presented with weakness, hypotension and bradycardia.  Patient states that he felt weak while on the toilet this morning.  Per EMS, daughter reported patient's SBP was 70 at home and he was diaphoretic.          Upon arriving to the ED, patient hemoglobin was 8.0 and he was hypotensive.  CT scan performed that showed a ruptured multiloculated liver cyst with hemoperitoneum.  Patient states that he has a long history of liver cysts and periodically has them assessed.  He was complaining of abdominal and back pain.        On 7/6 patient was transferred to SICU for hemodynamic monitoring.  He had serial CBCs performed and required 5 units of PRBCs, 1 unit of FFP and 1 unit of platelets.  Patient remained hemodynamically stable.  He was kept NPO with IV fluids.        On 7/7 patient went to IR for embolization of left hepatic artery and middle hepatic artery using gelfoam.  He remained NPO with IV fluids.        On 7/8 patient was advanced to a regular diet, which he tolerated well.  He remained hemodynamically stable and CBC remained stable. Seen by PT, no needs.        On 7/9 patient was continued a regular diet, which he tolerated well.  He remained hemodynamically stable and CBC remained stable. Patient bradycardiac during admission, home Metoprolol held. TSH elevated (22), T4 low (4.8). Instructions to follow up with Endocrinology provided in discharge instructions.        At the time of discharge, the patient was hemodynamically stable, was tolerating PO diet, was voiding urine and passing stool.  He was ambulating and was comfortable with adequate pain control.  The patient was instructed to follow up with Dr. Daniels within 1 week after discharge from the hospital.  The patient / family felt comfortable with discharge.  The patient had no other issues.         Per attending, patient deemed medically stable and cleared for discharge.        ATTENDING STATEMENT:    - I have seen and examined the patient on rounds. Patient's chart, labs, images and reports reviewed.    - Recovered from acute bleed likely from ruptured liver cyst vs mass- s/p IR embolization of left and middle hepatic artery    - Hb stable    Iris diet    Abd softer    -Patient evaluated to be stable for discharge. Patient educated with DC instructions, warning signs and symptoms . Pt also instructed to follow up with the surgeon in 1 week in office. Pt expressed verbal understanding.    -Patient instructed not to perform heavy activity and/or  lifting >10lbs for 8 weeks. Patient expressed verbal understanding.    -Patient instructed to follow up with me in my office 7-10 days after discharge. Follow up instructions provided.    - I have discussed the diagnosis and therapeutic plan with the patient in detail. Patient expressed verbal understanding and willingness to proceed with proposed plan. All questions answered    Regards    Terrance Daniels MD, FACS, FICS    - [employer] [facility]    Division of Surgical Oncology, Department of Surgery    [address] [address]        [address] [facility] [address]        [id number] [place] [id number]    Ph: [phone]    Fax: [phone]

## 2020-07-09 NOTE — DISCHARGE NOTE PROVIDER - CARE PROVIDER_API CALL
Terrance Christy)  Surgery  [address] [facility], Division [place] [address]  Phone: [phone]  Fax: [phone]  Follow Up Time: 1 week    Syed Lim)  Radiology  [address] [place] [address]  Phone: [phone]  Fax: [phone]  Follow Up Time: 1 week Terrance Christy)  Surgery  [address] [facility], [place] [address]  Phone: [phone]  Fax: [phone]  Follow Up Time: 1 week    Syed Lim)  Radiology  [address] [place] [address]  Phone: [phone]  Fax: [phone]  Follow Up Time: 1 week    Joey Contreras  CARDIOVASCULAR DISEASE  [address] [address]  Phone: [phone]  Fax: [phone]  Follow Up Time: 1 week

## 2020-07-09 NOTE — PROGRESS NOTE ADULT - ASSESSMENT
A& P:    75y Male PMH HTN, HLD, CAD with stent, afib (on Eliquis) BPH s/p TURP and large liver cysts, now with hemoperitoneum from     liver cyst rupture s/p 2 units PRBC 7/6, 3 units of PRBC 1 U FFP 1U Plasma transfused, he went to IR and had a Hepatic     angiogram with left hepatic and middle hepatic artery embolization.     PLAN:   Neurologic: AOx4  - Pain control with IV tylenol, Dilaudid for break through    Respiratory:   - Satting well on Room air    Cardiovascular:   - Metoprolol 50mg daily  - Atorvastatin 20mg daily  - losartan 100mg daily holding    Gastrointestinal/Nutrition: NPO  - omeprazole 40 daily  - NPO   - FAST exam positive revealing fluid in Morrisons pouch s/p embolization of  left hepatic and middle hepatic artery    Renal/Genitourinary: LR 75  - Home lasix 60 daily being held, PRN for fluid overload now  - finasteride 5mg daily  - Terazosin 5mg daily    Hematologic:   - Brilinta 90mg BID holding   - Eliquis 5 mg BID holding   - H/H trending Q6  - q 8 hour CBC    Infectious Disease:   - No issues     Endocrine:   - No issues     Disposition: SICU    Lines/Tubes: PIV A& P:    75y Male PMH HTN, HLD, CAD with stent, afib (on Eliquis) BPH s/p TURP and large liver cysts, now with hemoperitoneum from     liver cyst rupture s/p 2 units PRBC 7/6, 3 units of PRBC 1 U FFP 1U Plasma transfused, he went to IR and had a Hepatic     angiogram with left hepatic and middle hepatic artery embolization.     PLAN:   Neurologic: AOx4  - Pain control with IV tylenol, Dilaudid for break through    Respiratory:   - Satting well on Room air    Cardiovascular:   - Resume home lasix today  - Discontinue lopressor, change amiodarone to 100mg daily given bradycardia  - Continue Atorvastatin 20mg daily  - Resume home losartan 100 mg    Gastrointestinal/Nutrition:   - omeprazole 40 daily  - Regular diet  - FAST exam positive revealing fluid in Morrisons pouch s/p embolization of  left hepatic and middle hepatic artery    Renal/Genitourinary: LR 75  - Resume home lasix 60 daily  - finasteride 5mg daily  - Terazosin 5mg daily    Hematologic:   - ASA 81mg daily for cardiac stents (Placed in December), per Cardiology no need to resume Brillinta given 7 mos since stent placement, and Brillinta not used with Eliquis  - Eliquis 5 mg BID home med for AFIB, continue to hold   - H/H stable, daily CBC  - Resume DVT ppx    Infectious Disease:   - No issues     Endocrine:   - No issues     Disposition: Telemetry floor    Lines/Tubes: PIV

## 2020-07-09 NOTE — DISCHARGE NOTE PROVIDER - PROVIDER TOKENS
PROVIDER:[TOKEN:[[id number]],FOLLOWUP:[1 week]],PROVIDER:[TOKEN:[[id number]],FOLLOWUP:[1 week]] PROVIDER:[TOKEN:[[id number]],FOLLOWUP:[1 week]],PROVIDER:[TOKEN:[[id number]],FOLLOWUP:[1 week]],PROVIDER:[TOKEN:[[id number]],FOLLOWUP:[1 week]]

## 2020-07-09 NOTE — DISCHARGE NOTE PROVIDER - NSDCCPCAREPLAN_GEN_ALL_CORE_FT
PRINCIPAL DISCHARGE DIAGNOSIS  Diagnosis: Hemoperitoneum  Assessment and Plan of Treatment:       SECONDARY DISCHARGE DIAGNOSES  Diagnosis: Bradycardia  Assessment and Plan of Treatment:     Diagnosis: Hypotension  Assessment and Plan of Treatment: PRINCIPAL DISCHARGE DIAGNOSIS  Diagnosis: Hepatic cyst  Assessment and Plan of Treatment: You were diagnosed with a ruptured hemorrhagic cyst causing hemorrhagic shock that required the transfusion of blood products and ICU level of care. You underwent an embolization of your middle and left hepatic artery with interventional radiology on 07/07/2020.  You were observed in the ICU after the procedure and transferred to the floors when clinical stable.  You have a history of coronary artery disease with 1 stent placed in December 2019. Please hold (do not take) your previous home medication Brillinta. Please take ASA 81mg daily as recommended by the Cardiolgist Dr. Contreras who followed your care during your admission. A prescription for Aspirin 81mg daily was sent to [facility] pharmacy.   Prior to admission you were taking Eliquis for your history of Afib, please continue to hold (do not take) this medication. Please follow up with your private Cardiologist regarding restarting this medication as an outpatient. Please call to schedule an appointment within 1 week.   Prior to admission you were taking Metoprolol which can slow your heart rate. You were observed to have a slow heart rate during your admission and therfore this medication was held. Please continue to hold (do not take) this medication.         SECONDARY DISCHARGE DIAGNOSES  Diagnosis: Hypothyroidism  Assessment and Plan of Treatment: You were observed to have a slow heart rate during your admission. A TSH level was sent and returned as high (22), T4 level was low (4.8) and you were diagnosed with hypothyroidism. Please follow up with an Endocrinologist for further workup and treatment of hypothyroidism. Please call to schedule an appointment.  Prior to admission you were taking Metoprolol which can slow your heart rate. You were observed to have a slow heart rate during your admission and therfore this medication was held. A slow heart rate may be a clinical manifestation of hypothyroisim. Please continue to hold (do not take) this medication.    Diagnosis: Hemorrhagic shock  Assessment and Plan of Treatment: PRINCIPAL DISCHARGE DIAGNOSIS  Diagnosis: Hepatic cyst  Assessment and Plan of Treatment: You were diagnosed with a ruptured hemorrhagic cyst causing hemorrhagic shock that required the transfusion of blood products and ICU level of care. You underwent an embolization of your middle and left hepatic artery with interventional radiology on 07/07/2020.  You were observed in the ICU after the procedure and transferred to the floors when clinical stable.  You have a history of coronary artery disease with 1 stent placed in December 2019. Please hold (do not take) your previous home medication Brillinta. Please take ASA 81mg daily as recommended by the Cardiolgist Dr. Contreras who followed your care during your admission. A prescription for Aspirin 81mg daily was sent to [facility] pharmacy.   Prior to admission you were taking Eliquis for your history of Afib, please continue to hold (do not take) this medication. Please follow up with your private Cardiologist regarding restarting this medication as an outpatient. Please call to schedule an appointment within 1 week.   Prior to admission you were taking Metoprolol which can slow your heart rate. You were observed to have a slow heart rate during your admission and therfore this medication was held. Please continue to hold (do not take) this medication.         SECONDARY DISCHARGE DIAGNOSES  Diagnosis: Hypothyroidism  Assessment and Plan of Treatment: You were observed to have a slow heart rate during your admission. A TSH level was sent and returned as high (22), T4 level was low (4.8) and you were diagnosed with hypothyroidism. Please follow up with an Endocrinologist for further workup and treatment of hypothyroidism. Please call to schedule an appointment.  Prior to admission you were taking Metoprolol which can slow your heart rate. You were observed to have a slow heart rate during your admission and therfore this medication was held. A slow heart rate may be a clinical manifestation of hypothyroisim. Please continue to hold (do not take) this medication.    Diagnosis: Hemorrhagic shock  Assessment and Plan of Treatment:     Diagnosis: Hypokalemia  Assessment and Plan of Treatment:     Diagnosis: Hypophosphatemia  Assessment and Plan of Treatment:

## 2020-07-09 NOTE — PROGRESS NOTE ADULT - ASSESSMENT
Patient is a 75 year old male with a PMHx of HTN, CHF, HLD, AFib (on Eliquis), BPH, liver cysts and past surgical history of cardiac stent x1, colon resection for colon carcinoma (unclear from patient history what was resected) and an umbilical hernia repair with mesh who presented with weakness, hypotension and bradycardia POD#2 from IR hepatic angiogram w/ embolization of L and M hepatic artery recovering appropriately in the SICU.    PLAN:  - Continue to monitor CBC  - Pain control w/ dilaudid 0.5 mg q4 PRN, 1mg q4 PRN  - advance to regular diet   - restart home meds as appropriate  - monitor UO  - transfer to floor    Team D  [id number]

## 2020-07-09 NOTE — DISCHARGE NOTE PROVIDER - NSFOLLOWUPCLINICS_GEN_ALL_ED_FT
[facility]  Endocrinology  [address] [place] [address]  Phone: [phone]  Fax:   Follow Up Time: 1 week

## 2020-07-09 NOTE — PROGRESS NOTE ADULT - SUBJECTIVE AND OBJECTIVE BOX
Subjective: Patient seen and examined. No new events except as noted.     SUBJECTIVE/ROS:  No chest pain, dyspnea, palpitation, or dizziness.       MEDICATIONS:  MEDICATIONS  (STANDING):  aMIOdarone    Tablet 200 milliGRAM(s) Oral two times a day  aspirin  chewable 81 milliGRAM(s) Oral daily  atorvastatin 40 milliGRAM(s) Oral at bedtime  finasteride 5 milliGRAM(s) Oral daily  melatonin 6 milliGRAM(s) Oral at bedtime  metoprolol tartrate 25 milliGRAM(s) Oral two times a day  pantoprazole    Tablet 40 milliGRAM(s) Oral before breakfast  tamsulosin 0.4 milliGRAM(s) Oral at bedtime      PHYSICAL EXAM:  T(C): 36.3 (07-09-20 @ 07:00), Max: 36.8 (07-08-20 @ 12:00)  HR: 47 (07-09-20 @ 07:00) (40 - 66)  BP: 175/68 (07-09-20 @ 07:00) (120/76 - 175/68)  RR: 16 (07-09-20 @ 07:00) (6 - 27)  SpO2: 95% (07-09-20 @ 07:00) (93% - 99%)  Wt(kg): --  I&O's Summary    08 Jul 2020 07:01  -  09 Jul 2020 07:00  --------------------------------------------------------  IN: 1485 mL / OUT: 1085 mL / NET: 400 mL    09 Jul 2020 07:01  -  09 Jul 2020 08:31  --------------------------------------------------------  IN: 0 mL / OUT: 125 mL / NET: -125 mL            JVP: Normal  Neck: supple  Lung: clear   CV: S1 S2 , Murmur:  Abd: soft  Ext: No edema  neuro: Awake / alert  Psych: flat affect  Skin: normal``    LABS/DATA:    CARDIAC MARKERS:                                10.4   7.79  )-----------( 175      ( 09 Jul 2020 06:00 )             31.4     07-09    138  |  103  |  17  ----------------------------<  130<H>  3.5   |  25  |  0.96    Ca    8.8      09 Jul 2020 06:00  Phos  2.5     07-09  Mg     2.3     07-09    TPro  5.9<L>  /  Alb  3.1<L>  /  TBili  1.4<H>  /  DBili  0.4<H>  /  AST  42<H>  /  ALT  48<H>  /  AlkPhos  59  07-09    proBNP:   Lipid Profile:   HgA1c:   TSH:     TELE:  EKG:

## 2020-07-09 NOTE — PROGRESS NOTE ADULT - ASSESSMENT
Anemia  ruptured liver cyst   Monitor hemoglobin, transfuse as needed.  off a/c    PAF  s/p DCCV  in sinus  however HR is on low side DC Metoprolol   lower amio to 100 daily   off a/c given liver cyst rupture   check TSH     HTN  can add losartan     elevated LFT  mild   if progressively elevated then would hold statin     CAD history of stent 6 mo ago   recommend low dose asa and observe  cont statin     CHF unspecified  normal LV function on echo      Advanced care planning was discussed with patient and family.  Risks, benefits and alternatives of the cardiac treatments and medical therapy including procedures were discussed in detail and all questions were answered. Importance of compliance with medical therapy and lifestyle modification to improve cardiovascular health were addressed. Appropriate forms and patient educational materials were reviewed. 30 minutes face to face spent.

## 2020-07-09 NOTE — CHART NOTE - NSCHARTNOTEFT_GEN_A_CORE
D TEAM SURGERY TRANSFER NOTE    SUBJECTIVE: Patient seen and examined at bedside. Transferred from ICU to floor. Reports having two bowel movements overnight and passing flatus during the day. Has been OOB to chair and ambulated. Was advanced to a regular diet after breakfast and tolerated without nausea or vomiting. Denies chest pain/SOB. Sinus rhythm on telemetry.    HOSPITAL COURSE:  75 year old male with a PMHx of HTN, CHF, HLD, AFib (on Eliquis), BPH, liver cysts and past surgical history of cardiac stent x1, colon resection for colon carcinoma (unclear from patient history what was resected) and an umbilical hernia repair with mesh who presented with weakness, hypotension and bradycardia. Patient received CT scan showing a ruptured multiloculated liver cyst with hemoperitoneum. Patient admitted to SICU s/p 4 PRBC / 1 FFP / 1 Plasma. 7/7 underwent IR hepatic angiogram w/ embolization of L and M hepatic artery. While in SICU patient was noted to be persistently bradycardic to the 40s, metoprolol was discontinued and his amiodarone dose decreased to 100mg daily. On 7/9 he was hemodynamically stable and transferred to the floor.    Vital Signs Last 24 Hrs  T(C): 36.5 (09 Jul 2020 13:16), Max: 36.7 (09 Jul 2020 12:00)  T(F): 97.7 (09 Jul 2020 13:16), Max: 98 (09 Jul 2020 12:00)  HR: 77 (09 Jul 2020 13:16) (40 - 77)  BP: 140/77 (09 Jul 2020 13:16) (120/76 - 175/68)  BP(mean): 92 (09 Jul 2020 13:16) (68 - 100)  RR: 16 (09 Jul 2020 13:16) (6 - 27)  SpO2: 98% (09 Jul 2020 13:16) (93% - 99%)  I&O's Detail    08 Jul 2020 07:01  -  09 Jul 2020 07:00  --------------------------------------------------------  IN:    IV PiggyBack: 250 mL    lactated ringers.: 235 mL    Oral Fluid: 1000 mL  Total IN: 1485 mL    OUT:    Indwelling Catheter - Urethral: 1085 mL  Total OUT: 1085 mL    Total NET: 400 mL      09 Jul 2020 07:01  -  09 Jul 2020 13:39  --------------------------------------------------------  IN:    Oral Fluid: 340 mL  Total IN: 340 mL    OUT:    Indwelling Catheter - Urethral: 225 mL    Voided: 100 mL  Total OUT: 325 mL    Total NET: 15 mL      MEDICATIONS  (STANDING):  aMIOdarone    Tablet 100 milliGRAM(s) Oral daily  aspirin  chewable 81 milliGRAM(s) Oral daily  atorvastatin 40 milliGRAM(s) Oral at bedtime  finasteride 5 milliGRAM(s) Oral daily  furosemide Solution 60 milliGRAM(s) Oral daily  heparin   Injectable 5000 Unit(s) SubCutaneous every 8 hours  losartan 100 milliGRAM(s) Oral daily  melatonin 6 milliGRAM(s) Oral at bedtime  pantoprazole    Tablet 40 milliGRAM(s) Oral before breakfast  tamsulosin 0.4 milliGRAM(s) Oral at bedtime    MEDICATIONS  (PRN):  polyethylene glycol 3350 17 Gram(s) Oral two times a day PRN for constipation when out of bed to chair      Physical Exam  General: A&Ox3, NAD  Respiratory: Clear bilaterally, equal bilateral expansion  Cardiovascular: Regular rate & rhythm  Abdominal: Softly distended, non-tender, no rebound or guarding    LABS:                        10.4   7.79  )-----------( 175      ( 09 Jul 2020 06:00 )             31.4     07-09    138  |  103  |  17  ----------------------------<  130<H>  3.5   |  25  |  0.96    Ca    8.8      09 Jul 2020 06:00  Phos  2.5     07-09  Mg     2.3     07-09    TPro  5.9<L>  /  Alb  3.1<L>  /  TBili  1.4<H>  /  DBili  0.4<H>  /  AST  42<H>  /  ALT  48<H>  /  AlkPhos  59  07-09    PT/INR - ( 09 Jul 2020 06:00 )   PT: 14.2 SEC;   INR: 1.23          PTT - ( 09 Jul 2020 06:00 )  PTT:30.5 SEC      ASSESSMENT:  75 year old male with a PMHx of HTN, CHF, HLD, AFib (on Eliquis), BPH, liver cysts and past surgical history of cardiac stent x1, colon resection for colon carcinoma (unclear from patient history what was resected) and an umbilical hernia repair with mesh who presented with weakness, hypotension and bradycardia POD#2 from IR hepatic angiogram w/ embolization of L and M hepatic artery, recovering appropriately, transferred from SICU to surgical floor    PLAN:  - Regular diet as tolerated  - Metoprolol discontinued due to bradycardia  - Continue losartan, amiodarone and lasix  - Continue telemetry monitoring  - Cardiology recommendations appreciated, hold full dose AC, patient high risk for bleeding  - Cooper removed prior to transfer, patient voided 100ml, follow up trial of void, will check PVR  - VTE ppx w/ SQH  - OOB/ambulation  - AM labs ordered    Team D  [id number]

## 2020-07-09 NOTE — DISCHARGE NOTE PROVIDER - CARE PROVIDERS DIRECT ADDRESSES
[email or web address] [email or web address],DirectAddress_Unknown,DirectAddress_Unknown

## 2020-07-09 NOTE — DISCHARGE NOTE PROVIDER - NSDCFUADDAPPT_GEN_ALL_CORE_FT
Home Cardiologist: Dr. Bingham [phone]  Follow up time: 1 week   Regarding restarting home Eliquis.

## 2020-07-09 NOTE — DISCHARGE NOTE PROVIDER - NSDCMRMEDTOKEN_GEN_ALL_CORE_FT
amiodarone 200 mg oral tablet: orally 2 times a day  atorvastatin 40 mg oral tablet: 1 tab(s) orally once a day  Brilinta (ticagrelor) 90 mg oral tablet: 1 tab(s) orally 2 times a day  Centrum oral tablet: 1 tab(s) orally once a day  Eliquis 5 mg oral tablet: 1 tab(s) orally 2 times a day  finasteride 5 mg oral tablet: 1 tab(s) orally once a day  furosemide: 60 milligram(s) orally once a day  losartan 100 mg oral tablet: 1 tab(s) orally once a day  metoprolol succinate 50 mg oral tablet, extended release: 1 tab(s) orally once a day  omeprazole 40 mg oral delayed release capsule: 1 cap(s) orally once a day  tamsulosin 0.4 mg oral capsule: 1 cap(s) orally once a day  terazosin 5 mg oral tablet: 1 tab(s) orally once a day amiodarone 200 mg oral tablet: orally 2 times a day  aspirin 81 mg oral tablet, chewable: 1 tab(s) orally once a day MDD:1  atorvastatin 40 mg oral tablet: 1 tab(s) orally once a day  Centrum oral tablet: 1 tab(s) orally once a day  finasteride 5 mg oral tablet: 1 tab(s) orally once a day  furosemide: 60 milligram(s) orally once a day  losartan 100 mg oral tablet: 1 tab(s) orally once a day  omeprazole 40 mg oral delayed release capsule: 1 cap(s) orally once a day  tamsulosin 0.4 mg oral capsule: 1 cap(s) orally once a day  terazosin 5 mg oral tablet: 1 tab(s) orally once a day

## 2020-07-10 ENCOUNTER — TRANSCRIPTION ENCOUNTER (OUTPATIENT)
Age: 75
End: 2020-07-10

## 2020-07-10 VITALS
HEART RATE: 74 BPM | OXYGEN SATURATION: 96 % | RESPIRATION RATE: 18 BRPM | TEMPERATURE: 98 F | DIASTOLIC BLOOD PRESSURE: 78 MMHG | SYSTOLIC BLOOD PRESSURE: 143 MMHG

## 2020-07-10 LAB
ALBUMIN SERPL ELPH-MCNC: 3.3 G/DL — SIGNIFICANT CHANGE UP (ref 3.3–5)
ALP SERPL-CCNC: 61 U/L — SIGNIFICANT CHANGE UP (ref 40–120)
ALT FLD-CCNC: 52 U/L — HIGH (ref 4–41)
ANION GAP SERPL CALC-SCNC: 8 MMO/L — SIGNIFICANT CHANGE UP (ref 7–14)
APTT BLD: 30.1 SEC — SIGNIFICANT CHANGE UP (ref 27–36.3)
AST SERPL-CCNC: 37 U/L — SIGNIFICANT CHANGE UP (ref 4–40)
BILIRUB DIRECT SERPL-MCNC: 0.7 MG/DL — HIGH (ref 0.1–0.2)
BILIRUB SERPL-MCNC: 1.9 MG/DL — HIGH (ref 0.2–1.2)
BUN SERPL-MCNC: 22 MG/DL — SIGNIFICANT CHANGE UP (ref 7–23)
CALCIUM SERPL-MCNC: 8.4 MG/DL — SIGNIFICANT CHANGE UP (ref 8.4–10.5)
CHLORIDE SERPL-SCNC: 104 MMOL/L — SIGNIFICANT CHANGE UP (ref 98–107)
CO2 SERPL-SCNC: 26 MMOL/L — SIGNIFICANT CHANGE UP (ref 22–31)
CREAT SERPL-MCNC: 1.19 MG/DL — SIGNIFICANT CHANGE UP (ref 0.5–1.3)
GLUCOSE SERPL-MCNC: 126 MG/DL — HIGH (ref 70–99)
HCT VFR BLD CALC: 31.5 % — LOW (ref 39–50)
HGB BLD-MCNC: 10.4 G/DL — LOW (ref 13–17)
INR BLD: 1.26 — HIGH (ref 0.88–1.17)
MAGNESIUM SERPL-MCNC: 2.2 MG/DL — SIGNIFICANT CHANGE UP (ref 1.6–2.6)
MCHC RBC-ENTMCNC: 27.5 PG — SIGNIFICANT CHANGE UP (ref 27–34)
MCHC RBC-ENTMCNC: 33 % — SIGNIFICANT CHANGE UP (ref 32–36)
MCV RBC AUTO: 83.3 FL — SIGNIFICANT CHANGE UP (ref 80–100)
NRBC # FLD: 0 K/UL — SIGNIFICANT CHANGE UP (ref 0–0)
PHOSPHATE SERPL-MCNC: 2.4 MG/DL — LOW (ref 2.5–4.5)
PLATELET # BLD AUTO: 204 K/UL — SIGNIFICANT CHANGE UP (ref 150–400)
PMV BLD: 8.8 FL — SIGNIFICANT CHANGE UP (ref 7–13)
POTASSIUM SERPL-MCNC: 3.3 MMOL/L — LOW (ref 3.5–5.3)
POTASSIUM SERPL-SCNC: 3.3 MMOL/L — LOW (ref 3.5–5.3)
PROT SERPL-MCNC: 5.3 G/DL — LOW (ref 6–8.3)
PROTHROM AB SERPL-ACNC: 14.5 SEC — HIGH (ref 9.8–13.1)
RBC # BLD: 3.78 M/UL — LOW (ref 4.2–5.8)
RBC # FLD: 15.6 % — HIGH (ref 10.3–14.5)
SODIUM SERPL-SCNC: 138 MMOL/L — SIGNIFICANT CHANGE UP (ref 135–145)
T3 SERPL-MCNC: 81.5 NG/DL — SIGNIFICANT CHANGE UP (ref 80–200)
T4 AB SER-ACNC: 4.81 UG/DL — LOW (ref 5.1–13)
WBC # BLD: 7.23 K/UL — SIGNIFICANT CHANGE UP (ref 3.8–10.5)
WBC # FLD AUTO: 7.23 K/UL — SIGNIFICANT CHANGE UP (ref 3.8–10.5)

## 2020-07-10 RX ORDER — POTASSIUM CHLORIDE 20 MEQ
40 PACKET (EA) ORAL ONCE
Refills: 0 | Status: COMPLETED | OUTPATIENT
Start: 2020-07-10 | End: 2020-07-10

## 2020-07-10 RX ORDER — ASPIRIN/CALCIUM CARB/MAGNESIUM 324 MG
81 TABLET ORAL DAILY
Refills: 0 | Status: DISCONTINUED | OUTPATIENT
Start: 2020-07-10 | End: 2020-07-10

## 2020-07-10 RX ORDER — ASPIRIN/CALCIUM CARB/MAGNESIUM 324 MG
1 TABLET ORAL
Qty: 30 | Refills: 0
Start: 2020-07-10 | End: 2020-08-08

## 2020-07-10 RX ORDER — APIXABAN 2.5 MG/1
1 TABLET, FILM COATED ORAL
Qty: 0 | Refills: 0 | DISCHARGE

## 2020-07-10 RX ORDER — TICAGRELOR 90 MG/1
1 TABLET ORAL
Qty: 0 | Refills: 0 | DISCHARGE

## 2020-07-10 RX ORDER — SODIUM,POTASSIUM PHOSPHATES 278-250MG
1 POWDER IN PACKET (EA) ORAL ONCE
Refills: 0 | Status: COMPLETED | OUTPATIENT
Start: 2020-07-10 | End: 2020-07-10

## 2020-07-10 RX ORDER — METOPROLOL TARTRATE 50 MG
1 TABLET ORAL
Qty: 0 | Refills: 0 | DISCHARGE

## 2020-07-10 RX ADMIN — Medication 1 TABLET(S): at 08:26

## 2020-07-10 RX ADMIN — Medication 40 MILLIEQUIVALENT(S): at 08:26

## 2020-07-10 RX ADMIN — LOSARTAN POTASSIUM 100 MILLIGRAM(S): 100 TABLET, FILM COATED ORAL at 05:33

## 2020-07-10 RX ADMIN — Medication 60 MILLIGRAM(S): at 05:33

## 2020-07-10 RX ADMIN — AMIODARONE HYDROCHLORIDE 100 MILLIGRAM(S): 400 TABLET ORAL at 05:33

## 2020-07-10 RX ADMIN — HEPARIN SODIUM 5000 UNIT(S): 5000 INJECTION INTRAVENOUS; SUBCUTANEOUS at 05:33

## 2020-07-10 NOTE — PROGRESS NOTE ADULT - REASON FOR ADMISSION
Hemoperitoneum
Hemorrhagic jf in the setting of hemoperitoneum
Hemoperitoneum

## 2020-07-10 NOTE — PROGRESS NOTE ADULT - ASSESSMENT
75 year old male with a PMHx of HTN, CHF, HLD, AFib (on Eliquis), BPH, liver cysts and past surgical history of cardiac stent x1, colon resection for colon carcinoma (unclear from patient history what was resected) and an umbilical hernia repair with mesh who presented with weakness, hypotension and bradycardia and abdominal pain due to hemorrhagic shock secondary to hepatic cyst rupture. CT with sub capsular, perihepatic and intraperitoneal hyperdense fluid corresponding to hemoperitoneum. POD# 4 from IR hepatic angiogram w/ embolization of L and M hepatic artery, recovering appropriately, transferred from SICU to surgical floor.    PLAN:  - Regular diet as tolerated  - Metoprolol discontinued due to bradycardia  - Continue losartan, amiodarone and lasix  - Continue telemetry monitoring  - Resume baby aspirin  - F/u with cardiologist for outpatient Eliquis and recs.  - VTE ppx w/ SQH  - OOB/ambulation

## 2020-07-10 NOTE — PROGRESS NOTE ADULT - ASSESSMENT
Patient is a 75 year old male with a PMHx of HTN, CHF, HLD, CAD s/p stent x1 in Dec' 2019 and AFib (on Eliquis/Brillinta), BPH, known hepatic cysts, colon resection for colon carcinoma (unclear from patient history what was resected) and an umbilical hernia repair with mesh who presented to the ED on 07/06/2020 with abdominal pain and hypotension. CT scan significant for large hemorrhagic hepatic cyst and hemoperitoneum, hemodynamically stable after transfusion of 1U PRBC in ED. Patient admitted to the SICU for close BP monitoring and serial CBC's, s/p additional 4U PRBC, 1U FFP, 1U platelets. Now s/p IR hepatic angiogram w/ embolization of middle and left hepatic artery recovering appropriately in the SICU, now s/p transfer to floors.    PLAN:  - Continue to monitor CBC and LFT's daily.  - Pain control w/ dilaudid 0.5 mg q4 PRN, 1mg q4 PRN  - Continue regular diet.  - restart home meds as appropriate:        --Eliquis/Brillinta held in the setting of hemorrhagic hepatic cyst.       --Metoprolol held in the setting of bradycardia.  - Appreciate Cardiology recommendations.   - Will contact home Cardiologist regarding restarting ASA 81mg daily upon discharge and close follow up to resume Eliquis as outpatient.  - DVT ppx: SQH  - Activity: OOB/Ambulate as tolerated.  - PT: no needs.  - Discharge home.        Team D  [id number]

## 2020-07-10 NOTE — DISCHARGE NOTE NURSING/CASE MANAGEMENT/SOCIAL WORK - NSDCFUADDAPPT_GEN_ALL_CORE_FT
Home Cardiologist: Dr. Bingham [phone]  Follow up time: 1 week   Regarding restarting home Eliquis.

## 2020-07-10 NOTE — PROGRESS NOTE ADULT - SUBJECTIVE AND OBJECTIVE BOX
Patient seen and examined at bedside. Pleasant and cooperative. Flatus and BM +. Has been OOB to chair and ambulated. Tolerating regular diet without nausea or vomiting. Denies chest pain/SOB. Sinus rhythm on telemetry.      Physical Exam  General: NAD,   Eyes: No scleral Pallor.  Respiratory: Normal breathing  Abdominal: No tenderness, distension. Soft.   Extremities: No cyanosis.    Vital Signs Last 24 Hrs  T(C): 36.9 (10 Jul 2020 05:31), Max: 36.9 (10 Jul 2020 00:15)  T(F): 98.4 (10 Jul 2020 05:31), Max: 98.4 (10 Jul 2020 00:15)  HR: 55 (10 Jul 2020 05:31) (41 - 86)  BP: 141/64 (10 Jul 2020 05:31) (129/90 - 164/62)  BP(mean): 92 (09 Jul 2020 13:16) (83 - 100)  RR: 18 (10 Jul 2020 05:31) (14 - 19)  SpO2: 94% (10 Jul 2020 05:31) (94% - 99%)    I&O's Detail    09 Jul 2020 07:01  -  10 Jul 2020 07:00  --------------------------------------------------------  IN:    Oral Fluid: 340 mL  Total IN: 340 mL    OUT:    Indwelling Catheter - Urethral: 225 mL    Voided: 600 mL  Total OUT: 825 mL    Total NET: -485 mL          07-10    138  |  104  |  22  ----------------------------<  126<H>  3.3<L>   |  26  |  1.19    Ca    8.4      10 Jul 2020 05:22  Phos  2.4     07-10  Mg     2.2     07-10    TPro  5.3<L>  /  Alb  3.3  /  TBili  1.9<H>  /  DBili  0.7<H>  /  AST  37  /  ALT  52<H>  /  AlkPhos  61  07-10                            10.4   7.23  )-----------( 204      ( 10 Jul 2020 05:22 )             31.5       PT/INR - ( 10 Jul 2020 05:22 )   PT: 14.5 SEC;   INR: 1.26          PTT - ( 10 Jul 2020 05:22 )  PTT:30.1 SEC

## 2020-07-10 NOTE — DISCHARGE NOTE NURSING/CASE MANAGEMENT/SOCIAL WORK - PATIENT PORTAL LINK FT
You can access the FollowMyHealth Patient Portal offered by [facility] by registering at the following website: [email or web address]. By joining [facility]’s FollowMyHealth portal, you will also be able to view your health information using other applications (apps) compatible with our system.

## 2020-07-10 NOTE — PROGRESS NOTE ADULT - SUBJECTIVE AND OBJECTIVE BOX
Subjective: Patient seen and examined. No new events except as noted.     SUBJECTIVE/ROS:  No chest pain, dyspnea, palpitation, or dizziness.       MEDICATIONS:  MEDICATIONS  (STANDING):  aMIOdarone    Tablet 100 milliGRAM(s) Oral daily  aspirin  chewable 81 milliGRAM(s) Oral daily  atorvastatin 40 milliGRAM(s) Oral at bedtime  finasteride 5 milliGRAM(s) Oral daily  furosemide    Tablet 60 milliGRAM(s) Oral daily  heparin   Injectable 5000 Unit(s) SubCutaneous every 8 hours  losartan 100 milliGRAM(s) Oral daily  melatonin 6 milliGRAM(s) Oral at bedtime  pantoprazole    Tablet 40 milliGRAM(s) Oral before breakfast  tamsulosin 0.4 milliGRAM(s) Oral at bedtime      PHYSICAL EXAM:  T(C): 36.7 (07-10-20 @ 08:00), Max: 36.9 (07-10-20 @ 00:15)  HR: 74 (07-10-20 @ 08:00) (41 - 86)  BP: 143/78 (07-10-20 @ 08:00) (129/90 - 157/61)  RR: 18 (07-10-20 @ 08:00) (14 - 19)  SpO2: 96% (07-10-20 @ 08:00) (94% - 99%)  Wt(kg): --  I&O's Summary    09 Jul 2020 07:01  -  10 Jul 2020 07:00  --------------------------------------------------------  IN: 340 mL / OUT: 825 mL / NET: -485 mL            JVP: Normal  Neck: supple  Lung: clear   CV: S1 S2 , Murmur:  Abd: soft  Ext: No edema  neuro: Awake / alert  Psych: flat affect  Skin: normal``    LABS/DATA:    CARDIAC MARKERS:                                10.4   7.23  )-----------( 204      ( 10 Jul 2020 05:22 )             31.5     07-10    138  |  104  |  22  ----------------------------<  126<H>  3.3<L>   |  26  |  1.19    Ca    8.4      10 Jul 2020 05:22  Phos  2.4     07-10  Mg     2.2     07-10    TPro  5.3<L>  /  Alb  3.3  /  TBili  1.9<H>  /  DBili  0.7<H>  /  AST  37  /  ALT  52<H>  /  AlkPhos  61  07-10    proBNP:   Lipid Profile:   HgA1c:   TSH: Thyroid Stimulating Hormone, Serum: 22.23 uIU/mL (07-09 @ 12:20)      TELE:  EKG:

## 2020-07-10 NOTE — PROGRESS NOTE ADULT - SUBJECTIVE AND OBJECTIVE BOX
TE[facility] Surgery Daily Progress Note  =====================================================    SUBJECTIVE: Patient seen and examined in the morning. Patient reports that he feels well. Tolerating regular diet, denies nausea, vomiting. OOB/Ambulating as tolerated.     PAST MEDICAL & SURGICAL HISTORY:  Benign prostate hyperplasia  Hyperlipidemia  Hypertension  H/O ventral hernia repair  S/P colon resection      ALLERGIES:  penicillin (Swelling)    --------------------------------------------------------------------------------------    MEDICATIONS:    Neurologic Medications  melatonin 6 milliGRAM(s) Oral at bedtime    Cardiovascular Medications  aMIOdarone    Tablet 100 milliGRAM(s) Oral daily  furosemide    Tablet 60 milliGRAM(s) Oral daily  losartan 100 milliGRAM(s) Oral daily  tamsulosin 0.4 milliGRAM(s) Oral at bedtime    Gastrointestinal Medications  pantoprazole    Tablet 40 milliGRAM(s) Oral before breakfast  polyethylene glycol 3350 17 Gram(s) Oral two times a day PRN for constipation when out of bed to chair  potassium chloride    Tablet ER 40 milliEquivalent(s) Oral once  potassium phosphate / sodium phosphate Tablet (K-PHOS No. 2) 1 Tablet(s) Oral once    Hematologic/Oncologic Medications  aspirin  chewable 81 milliGRAM(s) Oral daily  heparin   Injectable 5000 Unit(s) SubCutaneous every 8 hours    Endocrine/Metabolic Medications  atorvastatin 40 milliGRAM(s) Oral at bedtime  finasteride 5 milliGRAM(s) Oral daily      --------------------------------------------------------------------------------------    VITAL SIGNS:  ICU Vital Signs Last 24 Hrs  T(C): 36.9 (10 Jul 2020 05:31), Max: 36.9 (10 Jul 2020 00:15)  T(F): 98.4 (10 Jul 2020 05:31), Max: 98.4 (10 Jul 2020 00:15)  HR: 55 (10 Jul 2020 05:31) (41 - 86)  BP: 141/64 (10 Jul 2020 05:31) (129/90 - 164/62)  BP(mean): 92 (09 Jul 2020 13:16) (83 - 100)  RR: 18 (10 Jul 2020 05:31) (14 - 19)  SpO2: 94% (10 Jul 2020 05:31) (94% - 99%)    --------------------------------------------------------------------------------------    EXAM    General: NAD, resting in bed comfortably.  Cardiac: irregularly irregular, S1S2  Respiratory: Nonlabored respirations, normal cw expansion.  Abdomen: softly distended, nontender.  Vascular: Warm and well perfused.    --------------------------------------------------------------------------------------    LABS                        10.4   7.23  )-----------( 204      ( 10 Jul 2020 05:22 )             31.5     07-10    138  |  104  |  22  ----------------------------<  126<H>  3.3<L>   |  26  |  1.19    Ca    8.4      10 Jul 2020 05:22  Phos  2.4     07-10  Mg     2.2     07-10    TPro  5.3<L>  /  Alb  3.3  /  TBili  1.9<H>  /  DBili  0.7<H>  /  AST  37  /  ALT  52<H>  /  AlkPhos  61  07-10      PT/INR - ( 10 Jul 2020 05:22 )   PT: 14.5 SEC;   INR: 1.26          PTT - ( 10 Jul 2020 05:22 )  PTT:30.1 SEC        --------------------------------------------------------------------------------------    INS AND OUTS:    I&O's Detail    09 Jul 2020 07:01  -  10 Jul 2020 07:00  --------------------------------------------------------  IN:    Oral Fluid: 340 mL  Total IN: 340 mL    OUT:    Indwelling Catheter - Urethral: 225 mL    Voided: 600 mL  Total OUT: 825 mL    Total NET: -485 mL    --------------------------------------------------------------------------------------

## 2020-07-10 NOTE — CHART NOTE - NSCHARTNOTEFT_GEN_A_CORE
Spoke to PT's cardiologist this AM to touch basis on plan of care regarding AC. All in agreement of holding pt's Eliquis, maintaining Aspirin.  Pt will resume his AC as an outpatient consultation. Pt was notified.

## 2020-07-10 NOTE — PROGRESS NOTE ADULT - ASSESSMENT
Anemia  ruptured liver cyst   Monitor hemoglobin, transfuse as needed.  off a/c    PAF  s/p DCCV  in sinus  HR is better cont amio 100 daily   off a/c due to liver cyst hemorrhage  EP consulted for watchman eval      HTN  stable     CAD history of stent 6 mo ago   recommend low dose asa and observe  cont statin     CHF unspecified  normal LV function on echo      Advanced care planning was discussed with patient and family.  Risks, benefits and alternatives of the cardiac treatments and medical therapy including procedures were discussed in detail and all questions were answered. Importance of compliance with medical therapy and lifestyle modification to improve cardiovascular health were addressed. Appropriate forms and patient educational materials were reviewed. 30 minutes face to face spent.

## 2020-07-20 PROBLEM — Z00.00 ENCOUNTER FOR PREVENTIVE HEALTH EXAMINATION: Status: ACTIVE | Noted: 2020-07-20

## 2020-07-21 ENCOUNTER — APPOINTMENT (OUTPATIENT)
Dept: SURGICAL ONCOLOGY | Facility: CLINIC | Age: 75
End: 2020-07-21
Payer: MEDICARE

## 2020-07-21 VITALS
DIASTOLIC BLOOD PRESSURE: 79 MMHG | HEIGHT: 68 IN | SYSTOLIC BLOOD PRESSURE: 130 MMHG | HEART RATE: 97 BPM | BODY MASS INDEX: 32.13 KG/M2 | OXYGEN SATURATION: 98 % | WEIGHT: 212 LBS

## 2020-07-21 DIAGNOSIS — C18.9 MALIGNANT NEOPLASM OF COLON, UNSPECIFIED: ICD-10-CM

## 2020-07-21 DIAGNOSIS — R16.0 HEPATOMEGALY, NOT ELSEWHERE CLASSIFIED: ICD-10-CM

## 2020-07-21 PROCEDURE — 99214 OFFICE O/P EST MOD 30 MIN: CPT

## 2020-07-22 NOTE — PHYSICAL EXAM
[FreeTextEntry1] : COVID -19 precautions as per [facility] policy was universally followed.\par  [Normal] : supple, no neck mass and thyroid not enlarged [Normal Supraclavicular Lymph Nodes] : normal supraclavicular lymph nodes [Normal Neck Lymph Nodes] : normal neck lymph nodes  [Normal Groin Lymph Nodes] : normal groin lymph nodes [Normal Axillary Lymph Nodes] : normal axillary lymph nodes [Normal] : oriented to person, place and time, with appropriate affect [de-identified] : Obese. Abdomen soft, non-tender, no masses, no hepatosplenomegaly, and no ascites.

## 2020-07-22 NOTE — REASON FOR VISIT
[Follow-Up Visit] : a follow-up visit for [FreeTextEntry2] : s/p embolization of left hepatic artery and middle hepatic artery using gelfoam

## 2020-07-22 NOTE — HISTORY OF PRESENT ILLNESS
[de-identified] : Mr. Simeon is a 76 y/o male who presents today for a follow up. He was initially seen at [facility] for hemoperitoneum. He presented to the ED with weakness, hypotension and brachycardia. Hemoglobin was 8.0 at arrival. CT scan identified a ruptured multiloculated liver cyst with hemoperitoneum. On 7/6 pt was given 5 units of PRBC, 1 unit of FFP and 1 unit of platelets in the SICU. On 7/7 pt went to IR for embolization of left hepatic artery and middle hepatic artery using gelfoam. \par \par Today he presents to my office for follow up. He denies any abdominal pain, no nausea or vomiting, reports regular BMs, no fever\par \par PMHx: HTN, CHF, HLD, Afib (Eliquis). BPH, liver cyst.\par Surgical Hx: Cardiac stent x1, colon resection for colon carcinoma, umbilical hernia repair with mesh.

## 2020-07-22 NOTE — ADDENDUM
[FreeTextEntry1] : I, Savanna Gibson, acted soley as a scribe for Dr. Daniels on this date 07/21/2020

## 2020-07-22 NOTE — ASSESSMENT
[FreeTextEntry1] : 75 Y M with h/o colon cancer h/o colectomy >10 years ago, known to have multiple liver cysts, recently admitted for spontaneous rupture -S/p IR embolization of left and middle hepatic artery \par \par \par PLAN: \par CMP, CBC, CEA today \par CT Abdomen first week of August to evaluate for resolution of hemoperitoneum and assess the liver cysts. Spontaneous rupture of liver cyst is a rare event, need to rule out for solid neoplasm vs possible metastatic implant given his h/o colon cancer.\par Currently on ASA , holding Eliquis, - per cardiologist- Discussed with \par I have discussed the diagnosis and therapeutic plan with the patient in detail. Patient expressed verbal understanding and willingness to proceed with proposed plan. All questions answered

## 2020-07-25 ENCOUNTER — APPOINTMENT (OUTPATIENT)
Dept: CT IMAGING | Facility: IMAGING CENTER | Age: 75
End: 2020-07-25

## 2020-07-28 ENCOUNTER — OUTPATIENT (OUTPATIENT)
Dept: OUTPATIENT SERVICES | Facility: HOSPITAL | Age: 75
LOS: 1 days | End: 2020-07-28
Payer: MEDICARE

## 2020-07-28 ENCOUNTER — RESULT REVIEW (OUTPATIENT)
Age: 75
End: 2020-07-28

## 2020-07-28 ENCOUNTER — APPOINTMENT (OUTPATIENT)
Dept: CT IMAGING | Facility: IMAGING CENTER | Age: 75
End: 2020-07-28
Payer: MEDICARE

## 2020-07-28 DIAGNOSIS — C18.9 MALIGNANT NEOPLASM OF COLON, UNSPECIFIED: ICD-10-CM

## 2020-07-28 DIAGNOSIS — R16.0 HEPATOMEGALY, NOT ELSEWHERE CLASSIFIED: ICD-10-CM

## 2020-07-28 DIAGNOSIS — Z98.890 OTHER SPECIFIED POSTPROCEDURAL STATES: Chronic | ICD-10-CM

## 2020-07-28 DIAGNOSIS — Z90.49 ACQUIRED ABSENCE OF OTHER SPECIFIED PARTS OF DIGESTIVE TRACT: Chronic | ICD-10-CM

## 2020-07-28 PROCEDURE — 74177 CT ABD & PELVIS W/CONTRAST: CPT

## 2020-07-28 PROCEDURE — 74177 CT ABD & PELVIS W/CONTRAST: CPT | Mod: 26

## 2020-08-11 NOTE — ASSESSMENT
[FreeTextEntry1] : 75 Y M with h/o colon cancer h/o colectomy >10 years ago, known to have multiple liver cysts, recently admitted for spontaneous rupture -S/p IR embolization of left and middle hepatic artery \par \par \par PLAN: \par CMP, CBC, CEA today \par CT Abdomen first week of August to evaluate for resolution of hemoperitoneum and assess the liver cysts. Spontaneous rupture of liver cyst is a rare event, need to rule out for solid neoplasm vs possible metastatic implant given his h/o colon cancer.\par I have discussed the diagnosis and therapeutic plan with the patient in detail. Patient expressed verbal understanding and willingness to proceed with proposed plan. All questions answered

## 2020-08-11 NOTE — HISTORY OF PRESENT ILLNESS
[de-identified] : Mr. Simeon is a 76 y/o male who presents today for continued follow up. He was initially seen at [facility] for hemoperitoneum. He presented to the ED with weakness, hypotension and brachycardia. Hemoglobin was 8.0 at arrival. CT scan identified a ruptured multiloculated liver cyst with hemoperitoneum. On 7/6 pt was given 5 units of PRBC, 1 unit of FFP and 1 unit of platelets in the SICU. On 7/7 pt went to IR for embolization of left hepatic artery and middle hepatic artery using gelfoam. \par \par Today he presents to my office for follow up. He denies any abdominal pain, no nausea or vomiting, reports regular BMs, no fever\par \par PMHx: HTN, CHF, HLD, Afib (Eliquis). BPH, liver cyst.\par Surgical Hx: Cardiac stent x1, colon resection for colon carcinoma, umbilical hernia repair with mesh.

## 2020-08-11 NOTE — PHYSICAL EXAM
[Normal] : supple, no neck mass and thyroid not enlarged [Normal Neck Lymph Nodes] : normal neck lymph nodes  [Normal Supraclavicular Lymph Nodes] : normal supraclavicular lymph nodes [Normal] : oriented to person, place and time, with appropriate affect [de-identified] : Obese. Abdomen soft, non-tender, no masses, no hepatosplenomegaly, and no ascites.

## 2020-08-12 ENCOUNTER — APPOINTMENT (OUTPATIENT)
Dept: SURGICAL ONCOLOGY | Facility: CLINIC | Age: 75
End: 2020-08-12
Payer: MEDICARE

## 2020-08-18 ENCOUNTER — APPOINTMENT (OUTPATIENT)
Dept: SURGICAL ONCOLOGY | Facility: CLINIC | Age: 75
End: 2020-08-18
Payer: MEDICARE

## 2020-08-18 VITALS
HEART RATE: 74 BPM | RESPIRATION RATE: 16 BRPM | OXYGEN SATURATION: 98 % | SYSTOLIC BLOOD PRESSURE: 126 MMHG | DIASTOLIC BLOOD PRESSURE: 66 MMHG | TEMPERATURE: 98.1 F

## 2020-08-18 PROCEDURE — 99214 OFFICE O/P EST MOD 30 MIN: CPT

## 2020-08-18 NOTE — PHYSICAL EXAM
[Normal] : supple, no neck mass and thyroid not enlarged [Normal Supraclavicular Lymph Nodes] : normal supraclavicular lymph nodes [Normal Neck Lymph Nodes] : normal neck lymph nodes  [Normal Groin Lymph Nodes] : normal groin lymph nodes [Normal Axillary Lymph Nodes] : normal axillary lymph nodes [Normal] : grossly intact [FreeTextEntry1] : COVID -19 precautions as per [facility] policy was universally followed.\par  [de-identified] : Obese. Abdomen soft, non-tender, no masses, no hepatosplenomegaly, and no ascites.

## 2020-08-18 NOTE — ADDENDUM
[FreeTextEntry1] : I, Naty Montero, acted soley as a scribe for Dr. Daniels on this date 08/18/2020

## 2020-08-18 NOTE — PHYSICAL EXAM
[Normal] : supple, no neck mass and thyroid not enlarged [Normal Supraclavicular Lymph Nodes] : normal supraclavicular lymph nodes [Normal Neck Lymph Nodes] : normal neck lymph nodes  [Normal Axillary Lymph Nodes] : normal axillary lymph nodes [Normal Groin Lymph Nodes] : normal groin lymph nodes [Normal] : oriented to person, place and time, with appropriate affect [FreeTextEntry1] : COVID -19 precautions as per [facility] policy was universally followed.\par  [de-identified] : Obese. Abdomen soft, non-tender, no masses, no hepatosplenomegaly, and no ascites.

## 2020-08-18 NOTE — ASSESSMENT
[FreeTextEntry1] : IMP: \par 75 Y M with h/o colon cancer h/o colectomy >10 years ago, known to have multiple liver cysts, recently admitted for spontaneous rupture -S/p IR embolization of left and middle hepatic artery \par \par \par PLAN: \par CBC/ CNP/ CEA - to be done today 8/18/2020. \par RTO MRI and PET scan to examine cysts in Sept 2020\par Follow up apt in Oct 2020\par Recommended pt to followup with gastroenterologist. \par CT Abdomen first week of August to evaluate for resolution of hemoperitoneum and assess the liver cysts. Spontaneous rupture of liver cyst is a rare event, need to rule out for solid neoplasm vs possible metastatic implant given his h/o colon cancer.\par Currently on ASA , holding Eliquis, - per cardiologist- Discussed with \par I have discussed the diagnosis and therapeutic plan with the patient in detail. Patient expressed verbal understanding and willingness to proceed with proposed plan. All questions answered

## 2020-08-18 NOTE — HISTORY OF PRESENT ILLNESS
[de-identified] : Mr. Simeon is a 74 y/o male who presents today for a follow up. He was initially seen at [facility] for hemoperitoneum. He presented to the ED with weakness, hypotension and brachycardia. Hemoglobin was 8.0 at arrival. CT scan identified a ruptured multiloculated liver cyst with hemoperitoneum. On 7/6 pt was given 5 units of PRBC, 1 unit of FFP and 1 unit of platelets in the SICU. On 7/7 pt went to IR for embolization of left hepatic artery and middle hepatic artery using gelfoam. \par \par Pt states he had his colon surgery - 19 years ago. \par \par Today he presents to my office for follow up. He denies any abdominal pain, no nausea or vomiting, reports regular BMs, no fever\par \par PMHx: HTN, CHF, HLD, Afib (Eliquis). BPH, liver cyst.\par Surgical Hx: Cardiac stent x1, colon resection for colon carcinoma, umbilical hernia repair with mesh.

## 2020-08-18 NOTE — HISTORY OF PRESENT ILLNESS
[de-identified] : Mr. Simeon is a 76 y/o male who presents today for a follow up. He was initially seen at [facility] for hemoperitoneum. He presented to the ED with weakness, hypotension and brachycardia. Hemoglobin was 8.0 at arrival. CT scan identified a ruptured multiloculated liver cyst with hemoperitoneum. On 7/6 pt was given 5 units of PRBC, 1 unit of FFP and 1 unit of platelets in the SICU. On 7/7 pt went to IR for embolization of left hepatic artery and middle hepatic artery using gelfoam. \par \par Pt states he had his colon surgery - 19 years ago. \par \par Today he presents to my office for follow up. He denies any abdominal pain, no nausea or vomiting, reports regular BMs, no fever\par \par PMHx: HTN, CHF, HLD, Afib (Eliquis). BPH, liver cyst.\par Surgical Hx: Cardiac stent x1, colon resection for colon carcinoma, umbilical hernia repair with mesh.

## 2020-08-18 NOTE — CONSULT LETTER
[Dear  ___] : Dear  [unfilled], [( Thank you for referring [unfilled] for consultation for _____ )] : Thank you for referring [unfilled] for consultation for [unfilled] [Consult Letter:] : I had the pleasure of evaluating your patient, [unfilled]. [Sincerely,] : Sincerely, [Please see my note below.] : Please see my note below. [Consult Closing:] : Thank you very much for allowing me to participate in the care of this patient.  If you have any questions, please do not hesitate to contact me. [FreeTextEntry3] : Terrance Daniels MD, FICS, FACS\par , Surgical Oncology\par [facility]\par [address]\[place], [place]- [address]\par \par [address]\[place]- [address]\par \par [address]\[place], [place]- [address]\par \par (mob) [phone]\par (o) [phone]\par (f) [phone]\par

## 2020-08-18 NOTE — CONSULT LETTER
[Dear  ___] : Dear  [unfilled], [( Thank you for referring [unfilled] for consultation for _____ )] : Thank you for referring [unfilled] for consultation for [unfilled] [Consult Letter:] : I had the pleasure of evaluating your patient, [unfilled]. [Sincerely,] : Sincerely, [Consult Closing:] : Thank you very much for allowing me to participate in the care of this patient.  If you have any questions, please do not hesitate to contact me. [Please see my note below.] : Please see my note below. [FreeTextEntry3] : Terrance Daniels MD, FICS, FACS\par , Surgical Oncology\par [facility] [employer]\par [address]\[place]- [address]\par \par [address]\[place]- [address]\par \par [address]\[place]- [address]\par \par (mob) [phone]\par (o) [phone]\par (f) [phone]\par

## 2020-08-28 PROBLEM — I10 ESSENTIAL (PRIMARY) HYPERTENSION: Chronic | Status: ACTIVE | Noted: 2020-07-06

## 2020-08-28 PROBLEM — N40.0 BENIGN PROSTATIC HYPERPLASIA WITHOUT LOWER URINARY TRACT SYMPTOMS: Chronic | Status: ACTIVE | Noted: 2020-07-06

## 2020-08-28 PROBLEM — E78.5 HYPERLIPIDEMIA, UNSPECIFIED: Chronic | Status: ACTIVE | Noted: 2020-07-06

## 2020-09-05 ENCOUNTER — RESULT REVIEW (OUTPATIENT)
Age: 75
End: 2020-09-05

## 2020-09-05 ENCOUNTER — APPOINTMENT (OUTPATIENT)
Dept: NUCLEAR MEDICINE | Facility: IMAGING CENTER | Age: 75
End: 2020-09-05
Payer: MEDICARE

## 2020-09-05 ENCOUNTER — OUTPATIENT (OUTPATIENT)
Dept: OUTPATIENT SERVICES | Facility: HOSPITAL | Age: 75
LOS: 1 days | End: 2020-09-05
Payer: MEDICARE

## 2020-09-05 DIAGNOSIS — C18.9 MALIGNANT NEOPLASM OF COLON, UNSPECIFIED: ICD-10-CM

## 2020-09-05 DIAGNOSIS — Z98.890 OTHER SPECIFIED POSTPROCEDURAL STATES: Chronic | ICD-10-CM

## 2020-09-05 DIAGNOSIS — Z90.49 ACQUIRED ABSENCE OF OTHER SPECIFIED PARTS OF DIGESTIVE TRACT: Chronic | ICD-10-CM

## 2020-09-05 PROCEDURE — 78815 PET IMAGE W/CT SKULL-THIGH: CPT

## 2020-09-05 PROCEDURE — A9552: CPT

## 2020-09-05 PROCEDURE — 78815 PET IMAGE W/CT SKULL-THIGH: CPT | Mod: 26,PI

## 2020-11-24 ENCOUNTER — APPOINTMENT (OUTPATIENT)
Dept: MRI IMAGING | Facility: IMAGING CENTER | Age: 75
End: 2020-11-24

## 2020-11-24 ENCOUNTER — OUTPATIENT (OUTPATIENT)
Dept: OUTPATIENT SERVICES | Facility: HOSPITAL | Age: 75
LOS: 1 days | End: 2020-11-24
Payer: MEDICARE

## 2020-11-24 ENCOUNTER — RESULT REVIEW (OUTPATIENT)
Age: 75
End: 2020-11-24

## 2020-11-24 DIAGNOSIS — Z98.890 OTHER SPECIFIED POSTPROCEDURAL STATES: Chronic | ICD-10-CM

## 2020-11-24 DIAGNOSIS — Z90.49 ACQUIRED ABSENCE OF OTHER SPECIFIED PARTS OF DIGESTIVE TRACT: Chronic | ICD-10-CM

## 2020-11-24 DIAGNOSIS — R16.0 HEPATOMEGALY, NOT ELSEWHERE CLASSIFIED: ICD-10-CM

## 2020-11-24 PROCEDURE — 74183 MRI ABD W/O CNTR FLWD CNTR: CPT | Mod: 26

## 2020-11-24 PROCEDURE — 74183 MRI ABD W/O CNTR FLWD CNTR: CPT

## 2020-11-24 PROCEDURE — A9585: CPT

## 2023-03-29 NOTE — PHYSICAL THERAPY INITIAL EVALUATION ADULT - ADDITIONAL COMMENTS
From: Robert Freeman  To: Michael Breaux  Sent: 3/28/2023 9:34 PM CDT  Subject: I have a colonoscopy coming up real soon     Hello,  They were supposed to contact me with instructions and times. The appointment date is less than a week and no one has contacted me. Who do I contact so I do not miss the appointment?  Dane Jones  
GI Discharge Instructions Endoscopy      1/13/2023    During your exam, the physician:    Removed polyps  Lab results will be called/mailed to you within 7-10 business days.  If you have not heard from the doctor within 10 days, call the office for results:  Dr. Harvey, [phone]    DIET INSTRUCTIONS:  Resume your regular diet and Advance your diet as you tolerate it    PRESCRIPTIONS/MEDICATIONS  No new prescriptions given today    A RESPONSIBLE ADULT MUST ACCOMPANY YOU AND DRIVE YOU HOME    You had the following procedure(s) today:   Colonoscopy     Avoid anti-inflammatory drugs, (Nuprin, Ibuprofen, Motrin, Advil, etc.) for 14 days.     Following sedation, your judgement, perception and coordination are impaired for a minimum of 24 hours.      Therefore:  Do not drive.  Do not return to work today.  Do not operate appliances or machinery that require quick reaction time  Do not sign legal documents or be involved in work decisions  Do not smoke or drink alcoholic beverages for 24 hours  Plan to spend a few hours resting before resuming your normal routine    Please call your physician in the event that you experience any of the following or proceed to the nearest hospital in the event of an emergency:     COLONOSCOPY  Fever  Severe abdominal distention or pain. Some mild distention and/or cramping are normal after these procedures but should pass within an hour or two with the passage of air.  Rectal bleeding more than blood streaking on the toilet  tissue  Nausea or Vomiting    If you have any questions or concerns, contact Dr. Harvey, [phone]    ADDITIONAL INSTRUCTIONS: none   
Pt states his grandson is staying with him currently and his son lives upstairs. Pt states he was independent in all ADL prior to admission, pt denies using assistive device prior to admission. Pt states he is able to ambulate 1 block and around the home until increased fatigue develops.